# Patient Record
Sex: MALE | Race: WHITE | NOT HISPANIC OR LATINO | Employment: OTHER | URBAN - METROPOLITAN AREA
[De-identification: names, ages, dates, MRNs, and addresses within clinical notes are randomized per-mention and may not be internally consistent; named-entity substitution may affect disease eponyms.]

---

## 2017-01-04 DIAGNOSIS — D75.1 ERYTHROCYTOSIS: Primary | ICD-10-CM

## 2017-01-05 ENCOUNTER — OFFICE VISIT (OUTPATIENT)
Dept: ONCOLOGY | Facility: CLINIC | Age: 72
End: 2017-01-05

## 2017-01-05 ENCOUNTER — LAB (OUTPATIENT)
Dept: LAB | Facility: HOSPITAL | Age: 72
End: 2017-01-05

## 2017-01-05 ENCOUNTER — INFUSION (OUTPATIENT)
Dept: ONCOLOGY | Facility: HOSPITAL | Age: 72
End: 2017-01-05

## 2017-01-05 VITALS
RESPIRATION RATE: 16 BRPM | BODY MASS INDEX: 39.22 KG/M2 | HEART RATE: 88 BPM | SYSTOLIC BLOOD PRESSURE: 128 MMHG | WEIGHT: 264.8 LBS | TEMPERATURE: 98.1 F | OXYGEN SATURATION: 95 % | HEIGHT: 69 IN | DIASTOLIC BLOOD PRESSURE: 68 MMHG

## 2017-01-05 DIAGNOSIS — D75.1 ERYTHROCYTOSIS: Primary | ICD-10-CM

## 2017-01-05 DIAGNOSIS — D75.1 ERYTHROCYTOSIS: ICD-10-CM

## 2017-01-05 LAB
ALBUMIN SERPL-MCNC: 4.3 G/DL (ref 3.5–5.2)
ALBUMIN/GLOB SERPL: 1.5 G/DL (ref 1.1–2.4)
ALP SERPL-CCNC: 58 U/L (ref 38–116)
ALT SERPL W P-5'-P-CCNC: 32 U/L (ref 0–41)
ANION GAP SERPL CALCULATED.3IONS-SCNC: 12.9 MMOL/L
AST SERPL-CCNC: 25 U/L (ref 0–40)
BASOPHILS # BLD AUTO: 0.13 10*3/MM3 (ref 0–0.1)
BASOPHILS NFR BLD AUTO: 1.5 % (ref 0–1.1)
BILIRUB SERPL-MCNC: 0.4 MG/DL (ref 0.1–1.2)
BUN BLD-MCNC: 19 MG/DL (ref 6–20)
BUN/CREAT SERPL: 19.6 (ref 7.3–30)
CALCIUM SPEC-SCNC: 9.5 MG/DL (ref 8.5–10.2)
CHLORIDE SERPL-SCNC: 98 MMOL/L (ref 98–107)
CO2 SERPL-SCNC: 26.1 MMOL/L (ref 22–29)
CREAT BLD-MCNC: 0.97 MG/DL (ref 0.7–1.3)
DEPRECATED RDW RBC AUTO: 39.8 FL (ref 37–49)
EOSINOPHIL # BLD AUTO: 0.22 10*3/MM3 (ref 0–0.36)
EOSINOPHIL NFR BLD AUTO: 2.5 % (ref 1–5)
ERYTHROCYTE [DISTWIDTH] IN BLOOD BY AUTOMATED COUNT: 12.7 % (ref 11.7–14.5)
GFR SERPL CREATININE-BSD FRML MDRD: 76 ML/MIN/1.73
GLOBULIN UR ELPH-MCNC: 2.9 GM/DL (ref 1.8–3.5)
GLUCOSE BLD-MCNC: 118 MG/DL (ref 74–124)
HCT VFR BLD AUTO: 47.5 % (ref 40–49)
HGB BLD-MCNC: 16.8 G/DL (ref 13.5–16.5)
IMM GRANULOCYTES # BLD: 0.28 10*3/MM3 (ref 0–0.03)
IMM GRANULOCYTES NFR BLD: 3.2 % (ref 0–0.5)
LYMPHOCYTES # BLD AUTO: 1.91 10*3/MM3 (ref 1–3.5)
LYMPHOCYTES NFR BLD AUTO: 21.9 % (ref 20–49)
MCH RBC QN AUTO: 30.5 PG (ref 27–33)
MCHC RBC AUTO-ENTMCNC: 35.4 G/DL (ref 32–35)
MCV RBC AUTO: 86.4 FL (ref 83–97)
MONOCYTES # BLD AUTO: 1.16 10*3/MM3 (ref 0.25–0.8)
MONOCYTES NFR BLD AUTO: 13.3 % (ref 4–12)
NEUTROPHILS # BLD AUTO: 5.01 10*3/MM3 (ref 1.5–7)
NEUTROPHILS NFR BLD AUTO: 57.6 % (ref 39–75)
NRBC BLD MANUAL-RTO: 0 /100 WBC (ref 0–0)
PLATELET # BLD AUTO: 246 10*3/MM3 (ref 150–375)
PMV BLD AUTO: 9.2 FL (ref 8.9–12.1)
POTASSIUM BLD-SCNC: 4.2 MMOL/L (ref 3.5–4.7)
PROT SERPL-MCNC: 7.2 G/DL (ref 6.3–8)
RBC # BLD AUTO: 5.5 10*6/MM3 (ref 4.3–5.5)
SODIUM BLD-SCNC: 137 MMOL/L (ref 134–145)
WBC NRBC COR # BLD: 8.71 10*3/MM3 (ref 4–10)

## 2017-01-05 PROCEDURE — 80053 COMPREHEN METABOLIC PANEL: CPT | Performed by: INTERNAL MEDICINE

## 2017-01-05 PROCEDURE — 36416 COLLJ CAPILLARY BLOOD SPEC: CPT

## 2017-01-05 PROCEDURE — 99195 PHLEBOTOMY: CPT

## 2017-01-05 PROCEDURE — 36415 COLL VENOUS BLD VENIPUNCTURE: CPT | Performed by: INTERNAL MEDICINE

## 2017-01-05 PROCEDURE — 85025 COMPLETE CBC W/AUTO DIFF WBC: CPT

## 2017-01-05 PROCEDURE — 99214 OFFICE O/P EST MOD 30 MIN: CPT | Performed by: INTERNAL MEDICINE

## 2017-01-05 RX ORDER — SODIUM CHLORIDE 9 MG/ML
250 INJECTION, SOLUTION INTRAVENOUS ONCE
Status: CANCELLED | OUTPATIENT
Start: 2017-02-01

## 2017-01-05 NOTE — PROGRESS NOTES
Subjective     REASON FOR FOLLOWUP:   1 Erythrocytosis left shifted oxygen dissociation curve?  High affinity hemoglobin  2.  Bilateral kidney cysts                               REQUESTING PHYSICIAN: Alessandra ANDUJAR    History of Present Illness the patient is 71-year-old male with long-standing erythrocytosis returns today to review the results of his blood work and imaging.  We did a chest x-ray because he complained of a chronic cough and this is thankfully benign except for some mild hyperinflation/COPD which is unusual as he is a nonsmoker.  Suspect the lisinopril is causing his cough.  Ultrasound of kidneys showed bilateral large kidney cysts but no masses.  His erythropoietin level was low at 5 and his, carbon monoxide level was mildly elevated 2.4 and his P 50 was left shifted suggesting a possible-high affinity hemoglobin!  His tolerating phlebotomy well and he actually felt better after the phlebotomy and therefore we will continue for the time being although I did tell him that I would repeat his carbon monoxide level to make sure it is back down as he got a new furnace for his home and this may cause some changes.  And he could get his phlebotomies at the [facility] because he has no evidence of polycythemia vera/ bone marrow disorder at this point.    Past Medical History   Diagnosis Date   • Hypertension    • Kidney stone    • Low hemoglobin    • Skin cancer      Left forearm    skin cancers for which he takes light treatments periodically    Past Surgical History   Procedure Laterality Date   • Colonoscopy       x4   • Kidney stone surgery  2006   • Endoscopy  1972   • Vasectomy  1982   • Hernia repair  1995   vasectomy, lithotripsy      HEME HISTORY:  patient is a 70-year-old male fairly good health except for hypertension and hypercholesterolemia and kidney stones was noted on recent blood work to have an elevated hemoglobin and hematocrit of 17/50% on 2 or 3 occasions.  Patient was referred to us for  evaluation of this.  He has had some recent headaches and lightheadedness which she attributes to his blood pressure medicines but otherwise feels well.  His note night sweats weight loss or loss of appetite or pain anywhere.  He has been on the same blood pressure medicines for many years.  On questioning him further he reports that when he was in his early 20s he works as a  and his hemoglobin was high in the 16 g range even then and he thinks this may be a long term problem that he has had.  His 2 children and 3 sisters but does not know of anybody else has an elevated hemoglobin.  He is not a smoker does not have sleep apnea and has a carbon monoxide monitoring in his home.  He has had no recent CAT scans of the abdomen no hematuria or kidney disease.  He is not taking any testosterone injections or preparations.        Current Outpatient Prescriptions:   •  aspirin 81 MG disintegrating tablet, Take 81 mg by mouth., Disp: , Rfl:   •  atorvastatin (LIPITOR) 10 MG tablet, , Disp: , Rfl:   •  cetirizine (zyrTEC) 10 MG tablet, Take 10 mg by mouth Daily., Disp: , Rfl:   •  lisinopril (PRINIVIL,ZESTRIL) 20 MG tablet, , Disp: , Rfl:   •  omega-3 acid ethyl esters (LOVAZA) 1 G capsule, , Disp: , Rfl:   •  triamterene-hydrochlorothiazide (MAXZIDE) 75-50 MG per tablet, Take 1 tablet by mouth., Disp: , Rfl:     ALLERGIES:    Allergies   Allergen Reactions   • Erythromycin    • Sulfa Antibiotics         Social History     Social History   • Marital status:      Spouse name: Radha   • Number of children: N/A   • Years of education: High school     Occupational History   • Electronic maintenance` Retired     [employer]     Social History Main Topics   • Smoking status: Never Smoker   • Smokeless tobacco: None   • Alcohol use Yes      Comment: Occasionally   • Drug use: No   • Sexual activity: Not Asked     Other Topics Concern   • None     Social History Narrative     does not smoke or drink no risk factors for  "HIV worked in [employer] has no chemicals or toxic exposure     Family History   Problem Relation Age of Onset   • Heart disease Mother    • Hypertension Mother    • Cancer Son 43     Squamous cell CA in tonsils       30-year-old son had squamous cell carcinoma of the tonsil 7 years ago and is JESSIE with no other malignancy in the family nobody has a high hemoglobin in the family     Review of Systems   Respiratory: Positive for cough.    Endocrine: Positive for heat intolerance and polydipsia.   Genitourinary: Positive for frequency.   Skin:        itching          Objective     Vitals:    01/05/17 0827   BP: 128/68   Pulse: 88   Resp: 16   Temp: 98.1 °F (36.7 °C)   TempSrc: Oral   SpO2: 95%   Weight: 264 lb 12.8 oz (120 kg)   Height: 69.49\" (176.5 cm)   PainSc: 0-No pain     Current Status 1/5/2017   ECOG score 0       Physical Exam    GENERAL:  Well-developed, well-nourished in no acute distress.   SKIN:  Warm, dry without rashes, purpura or petechiae.  HEAD:  Normocephalic.  EYES:  Pupils equal, round and reactive to light.  EOMs intact.  Conjunctivae normal.  EARS:  Hearing intact.  NOSE:  Septum midline.  No excoriations or nasal discharge.  MOUTH:  Tongue is well-papillated; no stomatitis or ulcers.  Lips normal.  THROAT:  Oropharynx without lesions or exudates.  NECK:  Supple with good range of motion; no thyromegaly or masses, no JVD.  LYMPHATICS:  No cervical, supraclavicular, axillary or inguinal adenopathy.  CHEST:  Lungs clear to percussion and auscultation. Good airflow.  CARDIAC:  Regular rate and rhythm without murmurs, rubs or gallops. Normal S1,S2.  ABDOMEN:  Soft, nontender with no organomegaly or masses.  EXTREMITIES:  No clubbing, cyanosis or edema.  NEUROLOGICAL:  Cranial Nerves II-XII grossly intact.  No focal neurological deficits.  PSYCHIATRIC:  Normal affect and mood.        RECENT LABS:  Hematology WBC   Date Value Ref Range Status   01/05/2017 8.71 4.00 - 10.00 10*3/mm3 " Final     RBC   Date Value Ref Range Status   01/05/2017 5.50 4.30 - 5.50 10*6/mm3 Final     HEMOGLOBIN   Date Value Ref Range Status   01/05/2017 16.8 (H) 13.5 - 16.5 g/dL Final     HEMATOCRIT   Date Value Ref Range Status   01/05/2017 47.5 40.0 - 49.0 % Final     PLATELETS   Date Value Ref Range Status   01/05/2017 246 150 - 375 10*3/mm3 Final        Peripheral blood smear shows normochromic normocytic red cells without polychromasia white cells show no increase in immature forms.  Occasional Dohle bodies are noted there is no eosinophilia or basophilia.  Platelets are increased but normal morphologically.    Assessment/Plan      1.  Isolated Erythrocytosis without leukocytosis or thrombocytosis- --the long duration of his symptoms suggest possibly a high affinity hemoglobin which is back to up by the left shifted P 50  2.  headaches and dizziness possibly related to erythrocytosis-improved after phlebotomy  3.  Cough etiology unclear -possibly from lisinopril    plan   1. one unit phlebotomy today -would like to keep his hematocrit below 47%  2.  Return in 3 months phlebotomy and M.D. visit in 6 months  3.  Carboxyhemoglobin level to be repeated today  He may benefit from avoiding a diuretic in terms of his hematocrit and he will discuss this with Dr. Aparicio

## 2017-01-06 LAB — COHGB MFR BLD: 2.3 % (ref 0–1.9)

## 2017-03-28 DIAGNOSIS — D75.1 ERYTHROCYTOSIS: Primary | ICD-10-CM

## 2017-03-30 ENCOUNTER — INFUSION (OUTPATIENT)
Dept: ONCOLOGY | Facility: HOSPITAL | Age: 72
End: 2017-03-30

## 2017-03-30 ENCOUNTER — LAB (OUTPATIENT)
Dept: LAB | Facility: HOSPITAL | Age: 72
End: 2017-03-30

## 2017-03-30 VITALS
BODY MASS INDEX: 39.02 KG/M2 | SYSTOLIC BLOOD PRESSURE: 113 MMHG | WEIGHT: 268 LBS | HEART RATE: 90 BPM | DIASTOLIC BLOOD PRESSURE: 72 MMHG | TEMPERATURE: 98.2 F

## 2017-03-30 DIAGNOSIS — D75.1 ERYTHROCYTOSIS: Primary | ICD-10-CM

## 2017-03-30 DIAGNOSIS — D75.1 ERYTHROCYTOSIS: ICD-10-CM

## 2017-03-30 LAB
BASOPHILS # BLD AUTO: 0.09 10*3/MM3 (ref 0–0.1)
BASOPHILS NFR BLD AUTO: 1.3 % (ref 0–1.1)
DEPRECATED RDW RBC AUTO: 37.9 FL (ref 37–49)
EOSINOPHIL # BLD AUTO: 0.13 10*3/MM3 (ref 0–0.36)
EOSINOPHIL NFR BLD AUTO: 1.9 % (ref 1–5)
ERYTHROCYTE [DISTWIDTH] IN BLOOD BY AUTOMATED COUNT: 12.3 % (ref 11.7–14.5)
HCT VFR BLD AUTO: 49.6 % (ref 40–49)
HGB BLD-MCNC: 17.4 G/DL (ref 13.5–16.5)
IMM GRANULOCYTES # BLD: 0.11 10*3/MM3 (ref 0–0.03)
IMM GRANULOCYTES NFR BLD: 1.6 % (ref 0–0.5)
LYMPHOCYTES # BLD AUTO: 1.21 10*3/MM3 (ref 1–3.5)
LYMPHOCYTES NFR BLD AUTO: 17.8 % (ref 20–49)
MCH RBC QN AUTO: 29.9 PG (ref 27–33)
MCHC RBC AUTO-ENTMCNC: 35.1 G/DL (ref 32–35)
MCV RBC AUTO: 85.2 FL (ref 83–97)
MONOCYTES # BLD AUTO: 1.29 10*3/MM3 (ref 0.25–0.8)
MONOCYTES NFR BLD AUTO: 18.9 % (ref 4–12)
NEUTROPHILS # BLD AUTO: 3.98 10*3/MM3 (ref 1.5–7)
NEUTROPHILS NFR BLD AUTO: 58.5 % (ref 39–75)
NRBC BLD MANUAL-RTO: 0 /100 WBC (ref 0–0)
PLATELET # BLD AUTO: 239 10*3/MM3 (ref 150–375)
PMV BLD AUTO: 9.5 FL (ref 8.9–12.1)
RBC # BLD AUTO: 5.82 10*6/MM3 (ref 4.3–5.5)
WBC NRBC COR # BLD: 6.81 10*3/MM3 (ref 4–10)

## 2017-03-30 PROCEDURE — 36416 COLLJ CAPILLARY BLOOD SPEC: CPT

## 2017-03-30 PROCEDURE — 85025 COMPLETE CBC W/AUTO DIFF WBC: CPT

## 2017-03-30 PROCEDURE — 99195 PHLEBOTOMY: CPT | Performed by: INTERNAL MEDICINE

## 2017-03-30 RX ORDER — SODIUM CHLORIDE 9 MG/ML
250 INJECTION, SOLUTION INTRAVENOUS ONCE
Status: CANCELLED | OUTPATIENT
Start: 2017-04-01

## 2017-03-30 NOTE — PROGRESS NOTES
Increase po fluids (no caffeine), no heavy lifting and no alcohol beverages x 24 hr    Instructed to leave dsg in place for one hr to minimize bruising.

## 2017-07-05 DIAGNOSIS — D75.1 ERYTHROCYTOSIS: Primary | ICD-10-CM

## 2017-07-06 ENCOUNTER — INFUSION (OUTPATIENT)
Dept: ONCOLOGY | Facility: HOSPITAL | Age: 72
End: 2017-07-06

## 2017-07-06 ENCOUNTER — LAB (OUTPATIENT)
Dept: LAB | Facility: HOSPITAL | Age: 72
End: 2017-07-06

## 2017-07-06 ENCOUNTER — OFFICE VISIT (OUTPATIENT)
Dept: ONCOLOGY | Facility: CLINIC | Age: 72
End: 2017-07-06

## 2017-07-06 VITALS
OXYGEN SATURATION: 97 % | WEIGHT: 267.6 LBS | DIASTOLIC BLOOD PRESSURE: 78 MMHG | SYSTOLIC BLOOD PRESSURE: 124 MMHG | BODY MASS INDEX: 37.46 KG/M2 | HEIGHT: 71 IN | HEART RATE: 78 BPM | RESPIRATION RATE: 14 BRPM | TEMPERATURE: 97.8 F

## 2017-07-06 DIAGNOSIS — D75.1 ERYTHROCYTOSIS: Primary | ICD-10-CM

## 2017-07-06 LAB
ALBUMIN SERPL-MCNC: 4.3 G/DL (ref 3.5–5.2)
ALBUMIN/GLOB SERPL: 1.5 G/DL (ref 1.1–2.4)
ALP SERPL-CCNC: 53 U/L (ref 38–116)
ALT SERPL W P-5'-P-CCNC: 29 U/L (ref 0–41)
ANION GAP SERPL CALCULATED.3IONS-SCNC: 11.8 MMOL/L
AST SERPL-CCNC: 24 U/L (ref 0–40)
BASOPHILS # BLD AUTO: 0.1 10*3/MM3 (ref 0–0.1)
BASOPHILS NFR BLD AUTO: 1.3 % (ref 0–1.1)
BILIRUB SERPL-MCNC: 0.6 MG/DL (ref 0.1–1.2)
BUN BLD-MCNC: 24 MG/DL (ref 6–20)
BUN/CREAT SERPL: 22.4 (ref 7.3–30)
CALCIUM SPEC-SCNC: 9.5 MG/DL (ref 8.5–10.2)
CHLORIDE SERPL-SCNC: 100 MMOL/L (ref 98–107)
CO2 SERPL-SCNC: 27.2 MMOL/L (ref 22–29)
CREAT BLD-MCNC: 1.07 MG/DL (ref 0.7–1.3)
DEPRECATED RDW RBC AUTO: 42.5 FL (ref 37–49)
EOSINOPHIL # BLD AUTO: 0.18 10*3/MM3 (ref 0–0.36)
EOSINOPHIL NFR BLD AUTO: 2.3 % (ref 1–5)
ERYTHROCYTE [DISTWIDTH] IN BLOOD BY AUTOMATED COUNT: 13 % (ref 11.7–14.5)
GFR SERPL CREATININE-BSD FRML MDRD: 68 ML/MIN/1.73
GLOBULIN UR ELPH-MCNC: 2.8 GM/DL (ref 1.8–3.5)
GLUCOSE BLD-MCNC: 106 MG/DL (ref 74–124)
HCT VFR BLD AUTO: 50.2 % (ref 40–49)
HGB BLD-MCNC: 17 G/DL (ref 13.5–16.5)
HOLD SPECIMEN: NORMAL
IMM GRANULOCYTES # BLD: 0.13 10*3/MM3 (ref 0–0.03)
IMM GRANULOCYTES NFR BLD: 1.7 % (ref 0–0.5)
LYMPHOCYTES # BLD AUTO: 1.73 10*3/MM3 (ref 1–3.5)
LYMPHOCYTES NFR BLD AUTO: 22.1 % (ref 20–49)
MCH RBC QN AUTO: 30.1 PG (ref 27–33)
MCHC RBC AUTO-ENTMCNC: 33.9 G/DL (ref 32–35)
MCV RBC AUTO: 88.8 FL (ref 83–97)
MONOCYTES # BLD AUTO: 0.95 10*3/MM3 (ref 0.25–0.8)
MONOCYTES NFR BLD AUTO: 12.1 % (ref 4–12)
NEUTROPHILS # BLD AUTO: 4.74 10*3/MM3 (ref 1.5–7)
NEUTROPHILS NFR BLD AUTO: 60.5 % (ref 39–75)
NRBC BLD MANUAL-RTO: 0 /100 WBC (ref 0–0)
PLATELET # BLD AUTO: 271 10*3/MM3 (ref 150–375)
PMV BLD AUTO: 9.1 FL (ref 8.9–12.1)
POTASSIUM BLD-SCNC: 4.8 MMOL/L (ref 3.5–4.7)
PROT SERPL-MCNC: 7.1 G/DL (ref 6.3–8)
RBC # BLD AUTO: 5.65 10*6/MM3 (ref 4.3–5.5)
SODIUM BLD-SCNC: 139 MMOL/L (ref 134–145)
WBC NRBC COR # BLD: 7.83 10*3/MM3 (ref 4–10)

## 2017-07-06 PROCEDURE — 80053 COMPREHEN METABOLIC PANEL: CPT

## 2017-07-06 PROCEDURE — 36415 COLL VENOUS BLD VENIPUNCTURE: CPT

## 2017-07-06 PROCEDURE — 99214 OFFICE O/P EST MOD 30 MIN: CPT | Performed by: INTERNAL MEDICINE

## 2017-07-06 PROCEDURE — 85025 COMPLETE CBC W/AUTO DIFF WBC: CPT

## 2017-07-06 PROCEDURE — 99195 PHLEBOTOMY: CPT | Performed by: INTERNAL MEDICINE

## 2017-07-06 RX ORDER — SODIUM CHLORIDE 9 MG/ML
250 INJECTION, SOLUTION INTRAVENOUS ONCE
Status: CANCELLED | OUTPATIENT
Start: 2017-07-06

## 2017-07-06 RX ORDER — SODIUM CHLORIDE 9 MG/ML
250 INJECTION, SOLUTION INTRAVENOUS ONCE
Status: DISCONTINUED | OUTPATIENT
Start: 2017-07-06 | End: 2017-07-06 | Stop reason: HOSPADM

## 2017-07-06 NOTE — PROGRESS NOTES
Subjective     REASON FOR FOLLOWUP:   1 Erythrocytosis left shifted oxygen dissociation curve?  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  Bilateral kidney cysts  3.  Persistent elevated carboxyhemoglobin with no obvious cause                             REQUESTING PHYSICIAN: Alessandra ANDUJAR    History of Present Illness patient 71-year-old male with erythrocytosis with persistent elevation of carboxyhemoglobin levels no obvious etiology and also left shifted P 50 suggesting a high affinity hemoglobin although carboxyhemoglobin alone can do this.  Anyway he is getting phlebotomies every 3 months and is tolerating it well and he actually feels better with less dizziness since his hematocrit was lowered.  He remains on a diuretic which probably worsens the erythrocytosis relatively and it may be better to take him off this but he will discuss this with his primary care doctor.    His repeat carboxyhemoglobin was also elevated in January despite changing his furnace and getting a CO monitor.  I cannot explain why this is still elevated!      Past Medical History:   Diagnosis Date   • Hypertension    • Kidney stone    • Low hemoglobin    • Skin cancer     Left forearm    skin cancers for which he takes light treatments periodically    Past Surgical History:   Procedure Laterality Date   • COLONOSCOPY      x4   • ENDOSCOPY  1972   • HERNIA REPAIR  1995   • KIDNEY STONE SURGERY  2006   • VASECTOMY  1982   vasectomy, lithotripsy      HEME HISTORY:  patient is a 70-year-old male fairly good health except for hypertension and hypercholesterolemia and kidney stones was noted on recent blood work to have an elevated hemoglobin and hematocrit of 17/50% on 2 or 3 occasions.  Patient was referred to us for evaluation of this.  He has had some recent headaches and lightheadedness which she attributes to his blood pressure medicines but otherwise feels well.  His note night sweats weight loss or loss of appetite or pain anywhere.   He has been on the same blood pressure medicines for many years.  On questioning him further he reports that when he was in his early 20s he works as a  and his hemoglobin was high in the 16 g range even then and he thinks this may be a long term problem that he has had.  His 2 children and 3 sisters but does not know of anybody else has an elevated hemoglobin.  He is not a smoker does not have sleep apnea and has a carbon monoxide monitoring in his home.  He has had no recent CAT scans of the abdomen no hematuria or kidney disease.  He is not taking any testosterone injections or preparations.    71-year-old male with long-standing erythrocytosis returns today to review the results of his blood work and imaging.  We did a chest x-ray because he complained of a chronic cough and this is thankfully benign except for some mild hyperinflation/COPD which is unusual as he is a nonsmoker.  Suspect the lisinopril is causing his cough.  Ultrasound of kidneys showed bilateral large kidney cysts but no masses.  His erythropoietin level was low at 5 and his, carbon monoxide level was mildly elevated 2.4 and his P 50 was left shifted suggesting a possible-high affinity hemoglobin!  His tolerating phlebotomy well and he actually felt better after the phlebotomy and therefore we will continue for the time being although I did tell him that I would repeat his carbon monoxide level to make sure it is back down as he got a new furnace for his home and this may cause some changes.  And he could get his phlebotomies at the [facility] because he has no evidence of polycythemia vera/ bone marrow disorder at this point.        Current Outpatient Prescriptions:   •  aspirin 81 MG disintegrating tablet, Take 81 mg by mouth., Disp: , Rfl:   •  atorvastatin (LIPITOR) 10 MG tablet, , Disp: , Rfl:   •  cetirizine (zyrTEC) 10 MG tablet, Take 10 mg by mouth Daily., Disp: , Rfl:   •  lisinopril (PRINIVIL,ZESTRIL) 20 MG tablet, , Disp: ,  "Rfl:   •  omega-3 acid ethyl esters (LOVAZA) 1 G capsule, , Disp: , Rfl:   •  triamterene-hydrochlorothiazide (MAXZIDE) 75-50 MG per tablet, Take 1 tablet by mouth., Disp: , Rfl:     ALLERGIES:    Allergies   Allergen Reactions   • Erythromycin    • Sulfa Antibiotics         Social History     Social History   • Marital status:      Spouse name: Radha   • Number of children: N/A   • Years of education: High school     Occupational History   • Electronic maintenance` Retired     [email or web address]     Social History Main Topics   • Smoking status: Never Smoker   • Smokeless tobacco: None   • Alcohol use Yes      Comment: Occasionally   • Drug use: No   • Sexual activity: Not Asked     Other Topics Concern   • None     Social History Narrative     does not smoke or drink no risk factors for HIV worked in [employer] has no chemicals or toxic exposure     Family History   Problem Relation Age of Onset   • Heart disease Mother    • Hypertension Mother    • Cancer Son 43     Squamous cell CA in tonsils       30-year-old son had squamous cell carcinoma of the tonsil 7 years ago and is JESSIE with no other malignancy in the family nobody has a high hemoglobin in the family     Review of Systems   Respiratory: Positive for cough.    Endocrine: Positive for heat intolerance and polydipsia.   Genitourinary: Positive for frequency.   Skin:        itching          Objective     Vitals:    07/06/17 1012   BP: 124/78   Pulse: 78   Resp: 14   Temp: 97.8 °F (36.6 °C)   TempSrc: Oral   SpO2: 97%   Weight: 267 lb 9.6 oz (121 kg)   Height: 70.87\" (180 cm)  Comment: new height   PainSc: 0-No pain     Current Status 7/6/2017   ECOG score 0       Physical Exam    GENERAL:  Well-developed, well-nourished in no acute distress.   SKIN:  Warm, dry without rashes, purpura or petechiae.  HEAD:  Normocephalic.  EYES:  Pupils equal, round and reactive to light.  EOMs intact.  Conjunctivae normal.  EARS:  Hearing intact.  NOSE:  Septum " midline.  No excoriations or nasal discharge.  MOUTH:  Tongue is well-papillated; no stomatitis or ulcers.  Lips normal.  THROAT:  Oropharynx without lesions or exudates.  NECK:  Supple with good range of motion; no thyromegaly or masses, no JVD.  LYMPHATICS:  No cervical, supraclavicular, axillary or inguinal adenopathy.  CHEST:  Lungs clear to percussion and auscultation. Good airflow.  CARDIAC:  Regular rate and rhythm without murmurs, rubs or gallops. Normal S1,S2.  ABDOMEN:  Soft, nontender with no organomegaly or masses.  EXTREMITIES:  No clubbing, cyanosis or edema.  NEUROLOGICAL:  Cranial Nerves II-XII grossly intact.  No focal neurological deficits.  PSYCHIATRIC:  Normal affect and mood.        RECENT LABS:  Hematology WBC   Date Value Ref Range Status   07/06/2017 7.83 4.00 - 10.00 10*3/mm3 Final     RBC   Date Value Ref Range Status   07/06/2017 5.65 (H) 4.30 - 5.50 10*6/mm3 Final     Hemoglobin   Date Value Ref Range Status   07/06/2017 17.0 (H) 13.5 - 16.5 g/dL Final     Hematocrit   Date Value Ref Range Status   07/06/2017 50.2 (H) 40.0 - 49.0 % Final     Platelets   Date Value Ref Range Status   07/06/2017 271 150 - 375 10*3/mm3 Final        Peripheral blood smear shows normochromic normocytic red cells without polychromasia white cells show no increase in immature forms.  Occasional Dohle bodies are noted there is no eosinophilia or basophilia.  Platelets are increased but normal morphologically.    Assessment/Plan      1.  Isolated Erythrocytosis without leukocytosis or thrombocytosis- --the long duration of his symptoms suggest possibly a high affinity hemoglobin which is backed up by the left shifted P 50  2.  headaches and dizziness possibly related to erythrocytosis-improved after phlebotomy  3.  Cough etiology unclear -possibly from lisinopril-Negative chest x-ray    plan   1. one unit phlebotomy today -would like to keep his hematocrit below 47%  2.  Return Q 3 months phlebotomy and M.D. visit  in 12months  He may benefit from avoiding a diuretic in terms of his hematocrit and he will discuss this with Dr. Aparicio

## 2017-09-25 ENCOUNTER — LAB (OUTPATIENT)
Dept: LAB | Facility: HOSPITAL | Age: 72
End: 2017-09-25

## 2017-09-25 ENCOUNTER — INFUSION (OUTPATIENT)
Dept: ONCOLOGY | Facility: HOSPITAL | Age: 72
End: 2017-09-25

## 2017-09-25 VITALS
DIASTOLIC BLOOD PRESSURE: 76 MMHG | TEMPERATURE: 98.4 F | HEART RATE: 81 BPM | SYSTOLIC BLOOD PRESSURE: 136 MMHG | BODY MASS INDEX: 37.13 KG/M2 | WEIGHT: 265.2 LBS

## 2017-09-25 DIAGNOSIS — D75.1 ERYTHROCYTOSIS: Primary | ICD-10-CM

## 2017-09-25 DIAGNOSIS — D75.1 ERYTHROCYTOSIS: ICD-10-CM

## 2017-09-25 LAB
BASOPHILS # BLD AUTO: 0.13 10*3/MM3 (ref 0–0.1)
BASOPHILS NFR BLD AUTO: 1.3 % (ref 0–1.1)
DEPRECATED RDW RBC AUTO: 40.2 FL (ref 37–49)
EOSINOPHIL # BLD AUTO: 0.26 10*3/MM3 (ref 0–0.36)
EOSINOPHIL NFR BLD AUTO: 2.7 % (ref 1–5)
ERYTHROCYTE [DISTWIDTH] IN BLOOD BY AUTOMATED COUNT: 12.8 % (ref 11.7–14.5)
HCT VFR BLD AUTO: 49.7 % (ref 40–49)
HGB BLD-MCNC: 17.3 G/DL (ref 13.5–16.5)
IMM GRANULOCYTES # BLD: 0.22 10*3/MM3 (ref 0–0.03)
IMM GRANULOCYTES NFR BLD: 2.3 % (ref 0–0.5)
LYMPHOCYTES # BLD AUTO: 2.05 10*3/MM3 (ref 1–3.5)
LYMPHOCYTES NFR BLD AUTO: 21.2 % (ref 20–49)
MCH RBC QN AUTO: 30.2 PG (ref 27–33)
MCHC RBC AUTO-ENTMCNC: 34.8 G/DL (ref 32–35)
MCV RBC AUTO: 86.9 FL (ref 83–97)
MONOCYTES # BLD AUTO: 1.55 10*3/MM3 (ref 0.25–0.8)
MONOCYTES NFR BLD AUTO: 16 % (ref 4–12)
NEUTROPHILS # BLD AUTO: 5.46 10*3/MM3 (ref 1.5–7)
NEUTROPHILS NFR BLD AUTO: 56.5 % (ref 39–75)
NRBC BLD MANUAL-RTO: 0 /100 WBC (ref 0–0)
PLATELET # BLD AUTO: 254 10*3/MM3 (ref 150–375)
PMV BLD AUTO: 9.4 FL (ref 8.9–12.1)
RBC # BLD AUTO: 5.72 10*6/MM3 (ref 4.3–5.5)
WBC NRBC COR # BLD: 9.67 10*3/MM3 (ref 4–10)

## 2017-09-25 PROCEDURE — 36416 COLLJ CAPILLARY BLOOD SPEC: CPT | Performed by: INTERNAL MEDICINE

## 2017-09-25 PROCEDURE — 99195 PHLEBOTOMY: CPT | Performed by: INTERNAL MEDICINE

## 2017-09-25 PROCEDURE — 85025 COMPLETE CBC W/AUTO DIFF WBC: CPT | Performed by: INTERNAL MEDICINE

## 2017-09-25 RX ORDER — SODIUM CHLORIDE 9 MG/ML
250 INJECTION, SOLUTION INTRAVENOUS ONCE
Status: CANCELLED | OUTPATIENT
Start: 2017-09-25

## 2017-12-13 RX ORDER — SODIUM CHLORIDE 9 MG/ML
250 INJECTION, SOLUTION INTRAVENOUS ONCE
Status: CANCELLED | OUTPATIENT
Start: 2017-12-18

## 2017-12-18 ENCOUNTER — INFUSION (OUTPATIENT)
Dept: ONCOLOGY | Facility: HOSPITAL | Age: 72
End: 2017-12-18

## 2017-12-18 ENCOUNTER — LAB (OUTPATIENT)
Dept: LAB | Facility: HOSPITAL | Age: 72
End: 2017-12-18

## 2017-12-18 VITALS
SYSTOLIC BLOOD PRESSURE: 136 MMHG | TEMPERATURE: 98.4 F | WEIGHT: 263.2 LBS | HEART RATE: 91 BPM | BODY MASS INDEX: 36.85 KG/M2 | DIASTOLIC BLOOD PRESSURE: 71 MMHG

## 2017-12-18 DIAGNOSIS — D75.1 ERYTHROCYTOSIS: ICD-10-CM

## 2017-12-18 DIAGNOSIS — D75.1 ERYTHROCYTOSIS: Primary | ICD-10-CM

## 2017-12-18 LAB
BASOPHILS # BLD AUTO: 0.11 10*3/MM3 (ref 0–0.1)
BASOPHILS NFR BLD AUTO: 1.2 % (ref 0–1.1)
DEPRECATED RDW RBC AUTO: 40.4 FL (ref 37–49)
EOSINOPHIL # BLD AUTO: 0.13 10*3/MM3 (ref 0–0.36)
EOSINOPHIL NFR BLD AUTO: 1.5 % (ref 1–5)
ERYTHROCYTE [DISTWIDTH] IN BLOOD BY AUTOMATED COUNT: 13 % (ref 11.7–14.5)
HCT VFR BLD AUTO: 50.6 % (ref 40–49)
HGB BLD-MCNC: 17.3 G/DL (ref 13.5–16.5)
IMM GRANULOCYTES # BLD: 0.07 10*3/MM3 (ref 0–0.03)
IMM GRANULOCYTES NFR BLD: 0.8 % (ref 0–0.5)
LYMPHOCYTES # BLD AUTO: 1.66 10*3/MM3 (ref 1–3.5)
LYMPHOCYTES NFR BLD AUTO: 18.8 % (ref 20–49)
MCH RBC QN AUTO: 29.5 PG (ref 27–33)
MCHC RBC AUTO-ENTMCNC: 34.2 G/DL (ref 32–35)
MCV RBC AUTO: 86.3 FL (ref 83–97)
MONOCYTES # BLD AUTO: 1.18 10*3/MM3 (ref 0.25–0.8)
MONOCYTES NFR BLD AUTO: 13.3 % (ref 4–12)
NEUTROPHILS # BLD AUTO: 5.69 10*3/MM3 (ref 1.5–7)
NEUTROPHILS NFR BLD AUTO: 64.4 % (ref 39–75)
NRBC BLD MANUAL-RTO: 0 /100 WBC (ref 0–0)
PLATELET # BLD AUTO: 297 10*3/MM3 (ref 150–375)
PMV BLD AUTO: 9.1 FL (ref 8.9–12.1)
RBC # BLD AUTO: 5.86 10*6/MM3 (ref 4.3–5.5)
WBC NRBC COR # BLD: 8.84 10*3/MM3 (ref 4–10)

## 2017-12-18 PROCEDURE — 36415 COLL VENOUS BLD VENIPUNCTURE: CPT | Performed by: INTERNAL MEDICINE

## 2017-12-18 PROCEDURE — 99195 PHLEBOTOMY: CPT | Performed by: INTERNAL MEDICINE

## 2017-12-18 PROCEDURE — 85025 COMPLETE CBC W/AUTO DIFF WBC: CPT | Performed by: INTERNAL MEDICINE

## 2017-12-18 RX ORDER — SODIUM CHLORIDE 9 MG/ML
250 INJECTION, SOLUTION INTRAVENOUS ONCE
Status: CANCELLED | OUTPATIENT
Start: 2017-12-18

## 2017-12-18 RX ORDER — SODIUM CHLORIDE 9 MG/ML
250 INJECTION, SOLUTION INTRAVENOUS ONCE
Status: DISCONTINUED | OUTPATIENT
Start: 2017-12-18 | End: 2017-12-18 | Stop reason: HOSPADM

## 2018-03-12 ENCOUNTER — APPOINTMENT (OUTPATIENT)
Dept: ONCOLOGY | Facility: HOSPITAL | Age: 73
End: 2018-03-12

## 2018-03-12 ENCOUNTER — APPOINTMENT (OUTPATIENT)
Dept: LAB | Facility: HOSPITAL | Age: 73
End: 2018-03-12

## 2018-04-16 ENCOUNTER — LAB (OUTPATIENT)
Dept: LAB | Facility: HOSPITAL | Age: 73
End: 2018-04-16

## 2018-04-16 ENCOUNTER — INFUSION (OUTPATIENT)
Dept: ONCOLOGY | Facility: HOSPITAL | Age: 73
End: 2018-04-16

## 2018-04-16 VITALS
BODY MASS INDEX: 34.1 KG/M2 | TEMPERATURE: 98.3 F | WEIGHT: 243.6 LBS | HEART RATE: 78 BPM | DIASTOLIC BLOOD PRESSURE: 77 MMHG | SYSTOLIC BLOOD PRESSURE: 132 MMHG

## 2018-04-16 DIAGNOSIS — D75.1 ERYTHROCYTOSIS: Primary | ICD-10-CM

## 2018-04-16 DIAGNOSIS — D75.1 ERYTHROCYTOSIS: ICD-10-CM

## 2018-04-16 LAB
BASOPHILS # BLD AUTO: 0.1 10*3/MM3 (ref 0–0.1)
BASOPHILS NFR BLD AUTO: 0.9 % (ref 0–1.1)
DEPRECATED RDW RBC AUTO: 42.8 FL (ref 37–49)
EOSINOPHIL # BLD AUTO: 0.16 10*3/MM3 (ref 0–0.36)
EOSINOPHIL NFR BLD AUTO: 1.5 % (ref 1–5)
ERYTHROCYTE [DISTWIDTH] IN BLOOD BY AUTOMATED COUNT: 13.8 % (ref 11.7–14.5)
HCT VFR BLD AUTO: 49.1 % (ref 40–49)
HGB BLD-MCNC: 16.5 G/DL (ref 13.5–16.5)
IMM GRANULOCYTES # BLD: 0.14 10*3/MM3 (ref 0–0.03)
IMM GRANULOCYTES NFR BLD: 1.3 % (ref 0–0.5)
LYMPHOCYTES # BLD AUTO: 1.75 10*3/MM3 (ref 1–3.5)
LYMPHOCYTES NFR BLD AUTO: 16.5 % (ref 20–49)
MCH RBC QN AUTO: 28.8 PG (ref 27–33)
MCHC RBC AUTO-ENTMCNC: 33.6 G/DL (ref 32–35)
MCV RBC AUTO: 85.7 FL (ref 83–97)
MONOCYTES # BLD AUTO: 1.26 10*3/MM3 (ref 0.25–0.8)
MONOCYTES NFR BLD AUTO: 11.9 % (ref 4–12)
NEUTROPHILS # BLD AUTO: 7.18 10*3/MM3 (ref 1.5–7)
NEUTROPHILS NFR BLD AUTO: 67.9 % (ref 39–75)
NRBC BLD MANUAL-RTO: 0 /100 WBC (ref 0–0)
PLATELET # BLD AUTO: 269 10*3/MM3 (ref 150–375)
PMV BLD AUTO: 9.6 FL (ref 8.9–12.1)
RBC # BLD AUTO: 5.73 10*6/MM3 (ref 4.3–5.5)
WBC NRBC COR # BLD: 10.59 10*3/MM3 (ref 4–10)

## 2018-04-16 PROCEDURE — 85025 COMPLETE CBC W/AUTO DIFF WBC: CPT | Performed by: INTERNAL MEDICINE

## 2018-04-16 PROCEDURE — 99195 PHLEBOTOMY: CPT | Performed by: NURSE PRACTITIONER

## 2018-04-16 PROCEDURE — 36416 COLLJ CAPILLARY BLOOD SPEC: CPT | Performed by: INTERNAL MEDICINE

## 2018-04-16 RX ORDER — SODIUM CHLORIDE 9 MG/ML
250 INJECTION, SOLUTION INTRAVENOUS ONCE
Status: CANCELLED | OUTPATIENT
Start: 2018-04-16

## 2018-04-16 RX ORDER — SODIUM CHLORIDE 9 MG/ML
250 INJECTION, SOLUTION INTRAVENOUS ONCE
Status: DISCONTINUED | OUTPATIENT
Start: 2018-04-16 | End: 2018-04-16 | Stop reason: HOSPADM

## 2018-06-19 ENCOUNTER — INFUSION (OUTPATIENT)
Dept: ONCOLOGY | Facility: HOSPITAL | Age: 73
End: 2018-06-19

## 2018-06-19 ENCOUNTER — OFFICE VISIT (OUTPATIENT)
Dept: ONCOLOGY | Facility: CLINIC | Age: 73
End: 2018-06-19

## 2018-06-19 ENCOUNTER — LAB (OUTPATIENT)
Dept: LAB | Facility: HOSPITAL | Age: 73
End: 2018-06-19

## 2018-06-19 VITALS
RESPIRATION RATE: 16 BRPM | HEIGHT: 71 IN | SYSTOLIC BLOOD PRESSURE: 118 MMHG | TEMPERATURE: 98 F | WEIGHT: 243.2 LBS | BODY MASS INDEX: 34.05 KG/M2 | OXYGEN SATURATION: 96 % | HEART RATE: 67 BPM | DIASTOLIC BLOOD PRESSURE: 80 MMHG

## 2018-06-19 DIAGNOSIS — D75.1 ERYTHROCYTOSIS: Primary | ICD-10-CM

## 2018-06-19 DIAGNOSIS — D75.1 ERYTHROCYTOSIS: ICD-10-CM

## 2018-06-19 LAB
ALBUMIN SERPL-MCNC: 4.4 G/DL (ref 3.5–5.2)
ALBUMIN/GLOB SERPL: 1.4 G/DL (ref 1.1–2.4)
ALP SERPL-CCNC: 62 U/L (ref 38–116)
ALT SERPL W P-5'-P-CCNC: 17 U/L (ref 0–41)
ANION GAP SERPL CALCULATED.3IONS-SCNC: 11.4 MMOL/L
AST SERPL-CCNC: 21 U/L (ref 0–40)
BASOPHILS # BLD AUTO: 0.12 10*3/MM3 (ref 0–0.1)
BASOPHILS NFR BLD AUTO: 1.3 % (ref 0–1.1)
BILIRUB SERPL-MCNC: 0.7 MG/DL (ref 0.1–1.2)
BUN BLD-MCNC: 24 MG/DL (ref 6–20)
BUN/CREAT SERPL: 21.2 (ref 7.3–30)
CALCIUM SPEC-SCNC: 10.1 MG/DL (ref 8.5–10.2)
CHLORIDE SERPL-SCNC: 99 MMOL/L (ref 98–107)
CO2 SERPL-SCNC: 27.6 MMOL/L (ref 22–29)
CREAT BLD-MCNC: 1.13 MG/DL (ref 0.7–1.3)
DEPRECATED RDW RBC AUTO: 43.1 FL (ref 37–49)
EOSINOPHIL # BLD AUTO: 0.2 10*3/MM3 (ref 0–0.36)
EOSINOPHIL NFR BLD AUTO: 2.1 % (ref 1–5)
ERYTHROCYTE [DISTWIDTH] IN BLOOD BY AUTOMATED COUNT: 13.3 % (ref 11.7–14.5)
GFR SERPL CREATININE-BSD FRML MDRD: 64 ML/MIN/1.73
GLOBULIN UR ELPH-MCNC: 3.1 GM/DL (ref 1.8–3.5)
GLUCOSE BLD-MCNC: 89 MG/DL (ref 74–124)
HCT VFR BLD AUTO: 49.4 % (ref 40–49)
HGB BLD-MCNC: 16.3 G/DL (ref 13.5–16.5)
IMM GRANULOCYTES # BLD: 0.09 10*3/MM3 (ref 0–0.03)
IMM GRANULOCYTES NFR BLD: 0.9 % (ref 0–0.5)
LYMPHOCYTES # BLD AUTO: 1.83 10*3/MM3 (ref 1–3.5)
LYMPHOCYTES NFR BLD AUTO: 19.1 % (ref 20–49)
MCH RBC QN AUTO: 29.1 PG (ref 27–33)
MCHC RBC AUTO-ENTMCNC: 33 G/DL (ref 32–35)
MCV RBC AUTO: 88.1 FL (ref 83–97)
MONOCYTES # BLD AUTO: 1.36 10*3/MM3 (ref 0.25–0.8)
MONOCYTES NFR BLD AUTO: 14.2 % (ref 4–12)
NEUTROPHILS # BLD AUTO: 6 10*3/MM3 (ref 1.5–7)
NEUTROPHILS NFR BLD AUTO: 62.4 % (ref 39–75)
NRBC BLD MANUAL-RTO: 0 /100 WBC (ref 0–0)
PLATELET # BLD AUTO: 273 10*3/MM3 (ref 150–375)
PMV BLD AUTO: 8.9 FL (ref 8.9–12.1)
POTASSIUM BLD-SCNC: 4.7 MMOL/L (ref 3.5–4.7)
PROT SERPL-MCNC: 7.5 G/DL (ref 6.3–8)
RBC # BLD AUTO: 5.61 10*6/MM3 (ref 4.3–5.5)
SODIUM BLD-SCNC: 138 MMOL/L (ref 134–145)
WBC NRBC COR # BLD: 9.6 10*3/MM3 (ref 4–10)

## 2018-06-19 PROCEDURE — 80053 COMPREHEN METABOLIC PANEL: CPT | Performed by: INTERNAL MEDICINE

## 2018-06-19 PROCEDURE — 99195 PHLEBOTOMY: CPT | Performed by: INTERNAL MEDICINE

## 2018-06-19 PROCEDURE — 99214 OFFICE O/P EST MOD 30 MIN: CPT | Performed by: INTERNAL MEDICINE

## 2018-06-19 PROCEDURE — 85025 COMPLETE CBC W/AUTO DIFF WBC: CPT | Performed by: INTERNAL MEDICINE

## 2018-06-19 PROCEDURE — 36415 COLL VENOUS BLD VENIPUNCTURE: CPT | Performed by: INTERNAL MEDICINE

## 2018-06-19 RX ORDER — SODIUM CHLORIDE 9 MG/ML
250 INJECTION, SOLUTION INTRAVENOUS ONCE
Status: CANCELLED | OUTPATIENT
Start: 2018-06-19

## 2018-06-19 RX ORDER — FLUTICASONE PROPIONATE 50 MCG
2 SPRAY, SUSPENSION (ML) NASAL AS NEEDED
COMMUNITY
End: 2019-08-20

## 2018-06-19 RX ORDER — SODIUM CHLORIDE 9 MG/ML
250 INJECTION, SOLUTION INTRAVENOUS ONCE
Status: DISCONTINUED | OUTPATIENT
Start: 2018-06-19 | End: 2018-06-19 | Stop reason: HOSPADM

## 2018-06-19 NOTE — PROGRESS NOTES
Subjective     REASON FOR FOLLOWUP:   1 Erythrocytosis left shifted oxygen dissociation curve?  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  Bilateral kidney cysts  3.  Persistent elevated carboxyhemoglobin with no obvious cause                             REQUESTING PHYSICIAN: Alessandra ANDUJAR    History of Present Illness patient 71-year-old male with erythrocytosis with persistent elevation of carboxyhemoglobin levels no obvious etiology and also left shifted P 50 suggesting a high affinity hemoglobin although carboxyhemoglobin alone can do this.  Anyway he is getting phlebotomies every 3 months and is tolerating it well and he actually feels better with less dizziness since his hematocrit was lowered.  He remains on a diuretic which probably worsens the erythrocytosis relatively and it may be better to take him off this but he will discuss this with his primary care doctor.    His repeat carboxyhemoglobin was also elevated in January despite changing his furnace and getting a CO monitor.  I cannot explain why this is still elevated!  We may need to move to every 2 month phlebotomies because of his elevated hematocrit and we will watch closely for iron deficiency    Past Medical History:   Diagnosis Date   • Hypertension    • Kidney stone    • Low hemoglobin    • Skin cancer     Left forearm    skin cancers for which he takes light treatments periodically    Past Surgical History:   Procedure Laterality Date   • COLONOSCOPY      x4   • ENDOSCOPY  1972   • HERNIA REPAIR  1995   • KIDNEY STONE SURGERY  2006   • VASECTOMY  1982   vasectomy, lithotripsy      HEME HISTORY:  patient is a 70-year-old male fairly good health except for hypertension and hypercholesterolemia and kidney stones was noted on recent blood work to have an elevated hemoglobin and hematocrit of 17/50% on 2 or 3 occasions.  Patient was referred to us for evaluation of this.  He has had some recent headaches and lightheadedness which she attributes  to his blood pressure medicines but otherwise feels well.  His note night sweats weight loss or loss of appetite or pain anywhere.  He has been on the same blood pressure medicines for many years.  On questioning him further he reports that when he was in his early 20s he works as a  and his hemoglobin was high in the 16 g range even then and he thinks this may be a long term problem that he has had.  His 2 children and 3 sisters but does not know of anybody else has an elevated hemoglobin.  He is not a smoker does not have sleep apnea and has a carbon monoxide monitoring in his home.  He has had no recent CAT scans of the abdomen no hematuria or kidney disease.  He is not taking any testosterone injections or preparations.    71-year-old male with long-standing erythrocytosis returns today to review the results of his blood work and imaging.  We did a chest x-ray because he complained of a chronic cough and this is thankfully benign except for some mild hyperinflation/COPD which is unusual as he is a nonsmoker.  Suspect the lisinopril is causing his cough.  Ultrasound of kidneys showed bilateral large kidney cysts but no masses.  His erythropoietin level was low at 5 and his, carbon monoxide level was mildly elevated 2.4 and his P 50 was left shifted suggesting a possible-high affinity hemoglobin!  His tolerating phlebotomy well and he actually felt better after the phlebotomy and therefore we will continue for the time being although I did tell him that I would repeat his carbon monoxide level to make sure it is back down as he got a new furnace for his home and this may cause some changes.  And he could get his phlebotomies at the [facility] because he has no evidence of polycythemia vera/ bone marrow disorder at this point.        Current Outpatient Prescriptions:   •  aspirin 81 MG disintegrating tablet, Take 81 mg by mouth., Disp: , Rfl:   •  atorvastatin (LIPITOR) 10 MG tablet, , Disp: , Rfl:   •   "fluticasone (FLONASE) 50 MCG/ACT nasal spray, 2 sprays into each nostril As Needed for Rhinitis., Disp: , Rfl:   •  lisinopril (PRINIVIL,ZESTRIL) 20 MG tablet, , Disp: , Rfl:   •  omega-3 acid ethyl esters (LOVAZA) 1 G capsule, , Disp: , Rfl:   •  triamterene-hydrochlorothiazide (MAXZIDE) 75-50 MG per tablet, Take 1 tablet by mouth., Disp: , Rfl:     ALLERGIES:    Allergies   Allergen Reactions   • Erythromycin    • Sulfa Antibiotics         Social History     Social History   • Marital status:      Spouse name: Radha   • Years of education: High school     Occupational History   • Electronic maintenance` Retired     [employer]     Social History Main Topics   • Smoking status: Never Smoker   • Alcohol use Yes      Comment: Occasionally   • Drug use: No     Other Topics Concern   • Not on file     does not smoke or drink no risk factors for HIV worked in [employer] has no chemicals or toxic exposure     Family History   Problem Relation Age of Onset   • Heart disease Mother    • Hypertension Mother    • Cancer Son 43        Squamous cell CA in tonsils       30-year-old son had squamous cell carcinoma of the tonsil 7 years ago and is JESSIE with no other malignancy in the family -nobody has a high hemoglobin in the family     Review of Systems   Respiratory: Positive for cough.    Endocrine: Positive for heat intolerance and polydipsia.   Genitourinary: Positive for frequency.   Skin:        itching          Objective     Vitals:    06/19/18 0903   BP: 118/80   Pulse: 67   Resp: 16   Temp: 98 °F (36.7 °C)   TempSrc: Oral   SpO2: 96%   Weight: 110 kg (243 lb 3.2 oz)   Height: 180 cm (70.87\")   PainSc: 0-No pain     Current Status 6/19/2018   ECOG score 0       Physical Exam    GENERAL:  Well-developed, well-nourished in no acute distress. Face is red  SKIN:  Warm, dry without rashes, purpura or petechiae.  HEAD:  Normocephalic.  EYES:  Pupils equal, round and reactive to light.  EOMs intact.  " Conjunctivae normal.  EARS:  Hearing intact.  NOSE:  Septum midline.  No excoriations or nasal discharge.  MOUTH:  Tongue is well-papillated; no stomatitis or ulcers.  Lips normal.  THROAT:  Oropharynx without lesions or exudates.  NECK:  Supple with good range of motion; no thyromegaly or masses, no JVD.  LYMPHATICS:  No cervical, supraclavicular, axillary or inguinal adenopathy.  CHEST:  Lungs clear to percussion and auscultation. Good airflow.  CARDIAC:  Regular rate and rhythm without murmurs, rubs or gallops. Normal S1,S2.  ABDOMEN:  Soft, nontender with no organomegaly or masses.  EXTREMITIES:  No clubbing, cyanosis or edema.  NEUROLOGICAL:  Cranial Nerves II-XII grossly intact.  No focal neurological deficits.  PSYCHIATRIC:  Normal affect and mood.        RECENT LABS:  Hematology WBC   Date Value Ref Range Status   06/19/2018 9.60 4.00 - 10.00 10*3/mm3 Final     RBC   Date Value Ref Range Status   06/19/2018 5.61 (H) 4.30 - 5.50 10*6/mm3 Final     Hemoglobin   Date Value Ref Range Status   06/19/2018 16.3 13.5 - 16.5 g/dL Final     Hematocrit   Date Value Ref Range Status   06/19/2018 49.4 (H) 40.0 - 49.0 % Final     Platelets   Date Value Ref Range Status   06/19/2018 273 150 - 375 10*3/mm3 Final        Peripheral blood smear shows normochromic normocytic red cells without polychromasia white cells show no increase in immature forms.  Occasional Dohle bodies are noted there is no eosinophilia or basophilia.  Platelets are increased but normal morphologically.    Assessment/Plan      1.  Isolated Erythrocytosis without leukocytosis or thrombocytosis- --the long duration of his symptoms suggest possibly a high affinity hemoglobin which is backed up by the left shifted P 50  2.  headaches and dizziness possibly related to erythrocytosis-improved after phlebotomy  3.  Cough etiology unclear -possibly from lisinopril-Negative chest x-ray    plan   1. one unit phlebotomy today -would like to keep his hematocrit  below 47%  2.  Return Q 2 months phlebotomy and M.D. visit in 6 months  He may benefit from avoiding a diuretic in terms of his hematocrit and he will discuss this with Dr. Aparicio

## 2018-08-21 ENCOUNTER — INFUSION (OUTPATIENT)
Dept: ONCOLOGY | Facility: HOSPITAL | Age: 73
End: 2018-08-21

## 2018-08-21 ENCOUNTER — LAB (OUTPATIENT)
Dept: LAB | Facility: HOSPITAL | Age: 73
End: 2018-08-21

## 2018-08-21 VITALS
BODY MASS INDEX: 34.94 KG/M2 | DIASTOLIC BLOOD PRESSURE: 73 MMHG | HEART RATE: 86 BPM | WEIGHT: 249.6 LBS | TEMPERATURE: 97.9 F | SYSTOLIC BLOOD PRESSURE: 131 MMHG

## 2018-08-21 DIAGNOSIS — D75.1 ERYTHROCYTOSIS: Primary | ICD-10-CM

## 2018-08-21 DIAGNOSIS — D75.1 ERYTHROCYTOSIS: ICD-10-CM

## 2018-08-21 LAB
BASOPHILS # BLD AUTO: 0.1 10*3/MM3 (ref 0–0.1)
BASOPHILS NFR BLD AUTO: 1.3 % (ref 0–1.1)
DEPRECATED RDW RBC AUTO: 40.1 FL (ref 37–49)
EOSINOPHIL # BLD AUTO: 0.17 10*3/MM3 (ref 0–0.36)
EOSINOPHIL NFR BLD AUTO: 2.2 % (ref 1–5)
ERYTHROCYTE [DISTWIDTH] IN BLOOD BY AUTOMATED COUNT: 13 % (ref 11.7–14.5)
HCT VFR BLD AUTO: 48.4 % (ref 40–49)
HGB BLD-MCNC: 16.4 G/DL (ref 13.5–16.5)
IMM GRANULOCYTES # BLD: 0.1 10*3/MM3 (ref 0–0.03)
IMM GRANULOCYTES NFR BLD: 1.3 % (ref 0–0.5)
LYMPHOCYTES # BLD AUTO: 1.49 10*3/MM3 (ref 1–3.5)
LYMPHOCYTES NFR BLD AUTO: 18.9 % (ref 20–49)
MCH RBC QN AUTO: 29 PG (ref 27–33)
MCHC RBC AUTO-ENTMCNC: 33.9 G/DL (ref 32–35)
MCV RBC AUTO: 85.7 FL (ref 83–97)
MONOCYTES # BLD AUTO: 1.55 10*3/MM3 (ref 0.25–0.8)
MONOCYTES NFR BLD AUTO: 19.7 % (ref 4–12)
NEUTROPHILS # BLD AUTO: 4.46 10*3/MM3 (ref 1.5–7)
NEUTROPHILS NFR BLD AUTO: 56.6 % (ref 39–75)
NRBC BLD MANUAL-RTO: 0 /100 WBC (ref 0–0)
PLATELET # BLD AUTO: 240 10*3/MM3 (ref 150–375)
PMV BLD AUTO: 9.4 FL (ref 8.9–12.1)
RBC # BLD AUTO: 5.65 10*6/MM3 (ref 4.3–5.5)
WBC NRBC COR # BLD: 7.87 10*3/MM3 (ref 4–10)

## 2018-08-21 PROCEDURE — 99195 PHLEBOTOMY: CPT | Performed by: INTERNAL MEDICINE

## 2018-08-21 PROCEDURE — 85025 COMPLETE CBC W/AUTO DIFF WBC: CPT | Performed by: INTERNAL MEDICINE

## 2018-08-21 PROCEDURE — 36415 COLL VENOUS BLD VENIPUNCTURE: CPT | Performed by: INTERNAL MEDICINE

## 2018-08-21 RX ORDER — SODIUM CHLORIDE 9 MG/ML
250 INJECTION, SOLUTION INTRAVENOUS ONCE
Status: CANCELLED | OUTPATIENT
Start: 2018-08-21

## 2018-08-21 RX ORDER — SODIUM CHLORIDE 9 MG/ML
250 INJECTION, SOLUTION INTRAVENOUS ONCE
Status: DISCONTINUED | OUTPATIENT
Start: 2018-08-21 | End: 2018-08-21 | Stop reason: HOSPADM

## 2018-08-21 NOTE — PROGRESS NOTES
Blood flow stopped at 350 cc.  Patient does not wish to be stuck again.  VSS.    Patient discharged.  Hct 48.4.  Phlebo for Hct 47 or greater.

## 2018-10-03 ENCOUNTER — TELEPHONE (OUTPATIENT)
Dept: ONCOLOGY | Facility: HOSPITAL | Age: 73
End: 2018-10-03

## 2018-10-03 NOTE — TELEPHONE ENCOUNTER
Patient calling about appointment on 10/23/18 for lab work and possible phlebo.  Pt states 10 days ago he had emergency stent put in at [facility] and was put on blood thinners, plavix , and new blood pressure meds started  Pt wants to know if it is okay to cancel appointment for October and wait to see Dr Andino in December .  Pt states blood counts were monitored at [facility] and he believes they were okay.  Pt has appointment with cardiologist next week   Advised patient we would go ahead and cancel appointment for October 23 and to keep appointment with Dr Andino in December  Pt states he will also discuss with his cardiologist next week

## 2018-10-03 NOTE — TELEPHONE ENCOUNTER
----- Message from Ewelina Barton RN sent at 10/3/2018  3:40 PM EDT -----  Please cancel pt appointment for Oct 23, 2018  Thanks

## 2018-12-13 ENCOUNTER — OFFICE VISIT (OUTPATIENT)
Dept: ONCOLOGY | Facility: CLINIC | Age: 73
End: 2018-12-13

## 2018-12-13 ENCOUNTER — LAB (OUTPATIENT)
Dept: LAB | Facility: HOSPITAL | Age: 73
End: 2018-12-13

## 2018-12-13 ENCOUNTER — INFUSION (OUTPATIENT)
Dept: ONCOLOGY | Facility: HOSPITAL | Age: 73
End: 2018-12-13

## 2018-12-13 VITALS
HEIGHT: 71 IN | BODY MASS INDEX: 34.83 KG/M2 | DIASTOLIC BLOOD PRESSURE: 60 MMHG | HEART RATE: 66 BPM | SYSTOLIC BLOOD PRESSURE: 118 MMHG | TEMPERATURE: 98.3 F | OXYGEN SATURATION: 94 % | RESPIRATION RATE: 18 BRPM | WEIGHT: 248.8 LBS

## 2018-12-13 DIAGNOSIS — D75.1 ERYTHROCYTOSIS: Primary | ICD-10-CM

## 2018-12-13 DIAGNOSIS — D75.1 ERYTHROCYTOSIS: ICD-10-CM

## 2018-12-13 LAB
ALBUMIN SERPL-MCNC: 4.4 G/DL (ref 3.5–5.2)
ALBUMIN/GLOB SERPL: 1.7 G/DL (ref 1.1–2.4)
ALP SERPL-CCNC: 65 U/L (ref 38–116)
ALT SERPL W P-5'-P-CCNC: 18 U/L (ref 0–41)
ANION GAP SERPL CALCULATED.3IONS-SCNC: 11.8 MMOL/L
AST SERPL-CCNC: 20 U/L (ref 0–40)
BASOPHILS # BLD AUTO: 0.12 10*3/MM3 (ref 0–0.1)
BASOPHILS NFR BLD AUTO: 1.7 % (ref 0–1.1)
BILIRUB SERPL-MCNC: 0.9 MG/DL (ref 0.1–1.2)
BUN BLD-MCNC: 20 MG/DL (ref 6–20)
BUN/CREAT SERPL: 20.4 (ref 7.3–30)
CALCIUM SPEC-SCNC: 10 MG/DL (ref 8.5–10.2)
CHLORIDE SERPL-SCNC: 105 MMOL/L (ref 98–107)
CO2 SERPL-SCNC: 26.2 MMOL/L (ref 22–29)
CREAT BLD-MCNC: 0.98 MG/DL (ref 0.7–1.3)
DEPRECATED RDW RBC AUTO: 45 FL (ref 37–49)
EOSINOPHIL # BLD AUTO: 0.19 10*3/MM3 (ref 0–0.36)
EOSINOPHIL NFR BLD AUTO: 2.6 % (ref 1–5)
ERYTHROCYTE [DISTWIDTH] IN BLOOD BY AUTOMATED COUNT: 13.9 % (ref 11.7–14.5)
GFR SERPL CREATININE-BSD FRML MDRD: 75 ML/MIN/1.73
GLOBULIN UR ELPH-MCNC: 2.6 GM/DL (ref 1.8–3.5)
GLUCOSE BLD-MCNC: 89 MG/DL (ref 74–124)
HCT VFR BLD AUTO: 50.7 % (ref 40–49)
HGB BLD-MCNC: 16.6 G/DL (ref 13.5–16.5)
IMM GRANULOCYTES # BLD: 0.06 10*3/MM3 (ref 0–0.03)
IMM GRANULOCYTES NFR BLD: 0.8 % (ref 0–0.5)
LYMPHOCYTES # BLD AUTO: 1.65 10*3/MM3 (ref 1–3.5)
LYMPHOCYTES NFR BLD AUTO: 23 % (ref 20–49)
MCH RBC QN AUTO: 28.7 PG (ref 27–33)
MCHC RBC AUTO-ENTMCNC: 32.7 G/DL (ref 32–35)
MCV RBC AUTO: 87.7 FL (ref 83–97)
MONOCYTES # BLD AUTO: 1.03 10*3/MM3 (ref 0.25–0.8)
MONOCYTES NFR BLD AUTO: 14.3 % (ref 4–12)
NEUTROPHILS # BLD AUTO: 4.13 10*3/MM3 (ref 1.5–7)
NEUTROPHILS NFR BLD AUTO: 57.6 % (ref 39–75)
NRBC BLD MANUAL-RTO: 0 /100 WBC (ref 0–0)
PLATELET # BLD AUTO: 253 10*3/MM3 (ref 150–375)
PMV BLD AUTO: 9.3 FL (ref 8.9–12.1)
POTASSIUM BLD-SCNC: 4.7 MMOL/L (ref 3.5–4.7)
PROT SERPL-MCNC: 7 G/DL (ref 6.3–8)
RBC # BLD AUTO: 5.78 10*6/MM3 (ref 4.3–5.5)
SODIUM BLD-SCNC: 143 MMOL/L (ref 134–145)
WBC NRBC COR # BLD: 7.18 10*3/MM3 (ref 4–10)

## 2018-12-13 PROCEDURE — 99214 OFFICE O/P EST MOD 30 MIN: CPT | Performed by: INTERNAL MEDICINE

## 2018-12-13 PROCEDURE — 80053 COMPREHEN METABOLIC PANEL: CPT | Performed by: INTERNAL MEDICINE

## 2018-12-13 PROCEDURE — 36415 COLL VENOUS BLD VENIPUNCTURE: CPT | Performed by: INTERNAL MEDICINE

## 2018-12-13 PROCEDURE — 85025 COMPLETE CBC W/AUTO DIFF WBC: CPT | Performed by: INTERNAL MEDICINE

## 2018-12-13 PROCEDURE — 99195 PHLEBOTOMY: CPT | Performed by: INTERNAL MEDICINE

## 2018-12-13 RX ORDER — POLYETHYLENE GLYCOL 3350 17 G/17G
17 POWDER, FOR SOLUTION ORAL DAILY
COMMUNITY

## 2018-12-13 RX ORDER — NITROGLYCERIN 0.4 MG/1
0.4 TABLET SUBLINGUAL
COMMUNITY

## 2018-12-13 RX ORDER — SODIUM CHLORIDE 9 MG/ML
250 INJECTION, SOLUTION INTRAVENOUS ONCE
Status: CANCELLED | OUTPATIENT
Start: 2018-12-13

## 2018-12-13 RX ORDER — METOPROLOL SUCCINATE 25 MG
25 TABLET, EXTENDED RELEASE 24 HR ORAL DAILY
COMMUNITY
End: 2022-11-27

## 2018-12-13 RX ORDER — CLOPIDOGREL BISULFATE 75 MG/1
TABLET ORAL
COMMUNITY
Start: 2018-12-12 | End: 2021-08-04

## 2018-12-13 NOTE — PROGRESS NOTES
Subjective     REASON FOR FOLLOWUP:   1 Erythrocytosis left shifted oxygen dissociation curve?  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  Bilateral kidney cysts  3.  Persistent elevated carboxyhemoglobin with no obvious cause                             REQUESTING PHYSICIAN: Alessandra ANDUJAR    History of Present Illness patient 71-year-old male with erythrocytosis with persistent elevation of carboxyhemoglobin levels no obvious etiology and also left shifted P 50 suggesting a high affinity hemoglobin although carboxyhemoglobin alone can do this.  Anyway he is getting phlebotomies every 2 months and is tolerating it weli but unfortunately had a heart attack in September with a hematocrit of 40% and therefore we will resume phlebotomy trying to keep him below 46%    Is otherwise doing well and going to cardiac rehabilitation.  One stent placed and is currently on an anticoagulant which she inadvertently thought would remove the need for phlebotomy and I explained it would not     his family doctor stopped his diuretics to see if this would improve his hematocrit but it did not unfortunately      Past Medical History:   Diagnosis Date   • Hypertension    • Kidney stone    • Low hemoglobin    • Myocardial infarction (CMS/HCC) 09/22/2018    placed one stent   • Skin cancer     Left forearm    skin cancers for which he takes light treatments periodically    Past Surgical History:   Procedure Laterality Date   • COLONOSCOPY      x4   • ENDOSCOPY  1972   • HERNIA REPAIR  1995   • KIDNEY STONE SURGERY  2006   • VASECTOMY  1982   vasectomy, lithotripsy      HEME HISTORY:  patient is a 70-year-old male fairly good health except for hypertension and hypercholesterolemia and kidney stones was noted on recent blood work to have an elevated hemoglobin and hematocrit of 17/50% on 2 or 3 occasions.  Patient was referred to us for evaluation of this.  He has had some recent headaches and lightheadedness which she attributes to his  blood pressure medicines but otherwise feels well.  His note night sweats weight loss or loss of appetite or pain anywhere.  He has been on the same blood pressure medicines for many years.  On questioning him further he reports that when he was in his early 20s he works as a  and his hemoglobin was high in the 16 g range even then and he thinks this may be a long term problem that he has had.  His 2 children and 3 sisters but does not know of anybody else has an elevated hemoglobin.  He is not a smoker does not have sleep apnea and has a carbon monoxide monitoring in his home.  He has had no recent CAT scans of the abdomen no hematuria or kidney disease.  He is not taking any testosterone injections or preparations.    71-year-old male with long-standing erythrocytosis returns today to review the results of his blood work and imaging.  We did a chest x-ray because he complained of a chronic cough and this is thankfully benign except for some mild hyperinflation/COPD which is unusual as he is a nonsmoker.  Suspect the lisinopril is causing his cough.  Ultrasound of kidneys showed bilateral large kidney cysts but no masses.  His erythropoietin level was low at 5 and his, carbon monoxide level was mildly elevated 2.4 and his P 50 was left shifted suggesting a possible-high affinity hemoglobin!  His tolerating phlebotomy well and he actually felt better after the phlebotomy and therefore we will continue for the time being although I did tell him that I would repeat his carbon monoxide level to make sure it is back down as he got a new furnace for his home and this may cause some changes.  And he could get his phlebotomies at the [facility] because he has no evidence of polycythemia vera/ bone marrow disorder at this point.        Current Outpatient Medications:   •  aspirin 81 MG disintegrating tablet, Take 81 mg by mouth., Disp: , Rfl:   •  atorvastatin (LIPITOR) 40 MG tablet, 40 mg., Disp: , Rfl:   •   clopidogrel (PLAVIX) 75 MG tablet, , Disp: , Rfl:   •  fluticasone (FLONASE) 50 MCG/ACT nasal spray, 2 sprays into each nostril As Needed for Rhinitis., Disp: , Rfl:   •  lisinopril (PRINIVIL,ZESTRIL) 2.5 MG tablet, , Disp: , Rfl:   •  metoprolol succinate XL (TOPROL-XL) 12.5 MG 24 hr half tablet, Take 25 mg by mouth Daily., Disp: , Rfl:   •  nitroglycerin (NITROSTAT) 0.4 MG SL tablet, Place 0.4 mg under the tongue Every 5 (Five) Minutes As Needed for Chest Pain. Take no more than 3 doses in 15 minutes., Disp: , Rfl:   •  omega-3 acid ethyl esters (LOVAZA) 1 G capsule, , Disp: , Rfl:   •  triamterene-hydrochlorothiazide (MAXZIDE) 75-50 MG per tablet, Take 1 tablet by mouth., Disp: , Rfl:     ALLERGIES:    Allergies   Allergen Reactions   • Erythromycin    • Sulfa Antibiotics    • Amoxicillin-Pot Clavulanate GI Intolerance     Stomach cramping--amoxicillin is OK          Social History     Socioeconomic History   • Marital status:      Spouse name: Radha   • Number of children: Not on file   • Years of education: High school   • Highest education level: Not on file   Occupational History   • Occupation: [employer] maintenance`     Employer: RETIRED     Comment: [employer]   Tobacco Use   • Smoking status: Never Smoker   Substance and Sexual Activity   • Alcohol use: Yes     Comment: Occasionally   • Drug use: No   • Sexual activity: Defer     does not smoke or drink no risk factors for HIV worked in [place] [employer] has no chemicals or toxic exposure     Family History   Problem Relation Age of Onset   • Heart disease Mother    • Hypertension Mother    • Cancer Son 43        Squamous cell CA in tonsils       30-year-old son had squamous cell carcinoma of the tonsil 7 years ago and is JESSIE with no other malignancy in the family -nobody has a high hemoglobin in the family     Review of Systems   Constitutional: Negative for chills and fever.   HENT: Negative for congestion and mouth sores.   "  Respiratory: Negative for cough, chest tightness and shortness of breath.    Cardiovascular: Negative for chest pain.   Gastrointestinal: Positive for blood in stool (better 12/13/18). Negative for abdominal pain, constipation, diarrhea, nausea and vomiting.   Endocrine: Negative for heat intolerance and polydipsia.   Genitourinary: Positive for frequency (better 12/13/18).   Skin:        itching     Neurological: Negative for dizziness and headaches.   Psychiatric/Behavioral: The patient is not nervous/anxious.         Objective     Vitals:    12/13/18 1105   Height: 180 cm (70.87\")   PainSc: 0-No pain     Current Status 12/13/2018   ECOG score 0       Physical Exam    GENERAL:  Well-developed, well-nourished in no acute distress. Face is red  SKIN:  Warm, dry without rashes, purpura or petechiae.  HEAD:  Normocephalic.  EYES:  Pupils equal, round and reactive to light.  EOMs intact.  Conjunctivae normal.  EARS:  Hearing intact.  NOSE:  Septum midline.  No excoriations or nasal discharge.  MOUTH:  Tongue is well-papillated; no stomatitis or ulcers.  Lips normal.  THROAT:  Oropharynx without lesions or exudates.  NECK:  Supple with good range of motion; no thyromegaly or masses, no JVD.  LYMPHATICS:  No cervical, supraclavicular, axillary or inguinal adenopathy.  CHEST:  Lungs clear to percussion and auscultation. Good airflow.  CARDIAC:  Regular rate and rhythm without murmurs, rubs or gallops. Normal S1,S2.  ABDOMEN:  Soft, nontender with no organomegaly or masses.  EXTREMITIES:  No clubbing, cyanosis or edema.  NEUROLOGICAL:  Cranial Nerves II-XII grossly intact.  No focal neurological deficits.  PSYCHIATRIC:  Normal affect and mood.        RECENT LABS:  Hematology WBC   Date Value Ref Range Status   12/13/2018 7.18 4.00 - 10.00 10*3/mm3 Final     RBC   Date Value Ref Range Status   12/13/2018 5.78 (H) 4.30 - 5.50 10*6/mm3 Final     Hemoglobin   Date Value Ref Range Status   12/13/2018 16.6 (H) 13.5 - 16.5 g/dL " Final     Hematocrit   Date Value Ref Range Status   12/13/2018 50.7 (H) 40.0 - 49.0 % Final     Platelets   Date Value Ref Range Status   12/13/2018 253 150 - 375 10*3/mm3 Final        Peripheral blood smear shows normochromic normocytic red cells without polychromasia white cells show no increase in immature forms.  Occasional Dohle bodies are noted there is no eosinophilia or basophilia.  Platelets are increased but normal morphologically.    Assessment/Plan      1.  Isolated Erythrocytosis without leukocytosis or thrombocytosis- --the long duration of his symptoms suggest possibly a high affinity hemoglobin which is backed up by the left shifted P 50  2.  headaches and dizziness possibly related to erythrocytosis-improved after phlebotomy  3.  Cough etiology unclear -possibly from lisinopril-Negative chest x-ray    plan   1. one unit phlebotomy today -would like to keep his hematocrit below 46%  2.  Return Q 2 months phlebotomy and NP in 4 months and M.D. visit in 8 month

## 2019-02-12 RX ORDER — SODIUM CHLORIDE 9 MG/ML
250 INJECTION, SOLUTION INTRAVENOUS ONCE
Status: CANCELLED | OUTPATIENT
Start: 2019-02-13

## 2019-02-13 ENCOUNTER — LAB (OUTPATIENT)
Dept: LAB | Facility: HOSPITAL | Age: 74
End: 2019-02-13

## 2019-02-13 ENCOUNTER — INFUSION (OUTPATIENT)
Dept: ONCOLOGY | Facility: HOSPITAL | Age: 74
End: 2019-02-13

## 2019-02-13 VITALS
HEART RATE: 59 BPM | DIASTOLIC BLOOD PRESSURE: 71 MMHG | WEIGHT: 255.8 LBS | BODY MASS INDEX: 35.81 KG/M2 | SYSTOLIC BLOOD PRESSURE: 127 MMHG

## 2019-02-13 DIAGNOSIS — D75.1 ERYTHROCYTOSIS: ICD-10-CM

## 2019-02-13 DIAGNOSIS — D75.1 ERYTHROCYTOSIS: Primary | ICD-10-CM

## 2019-02-13 LAB
BASOPHILS # BLD AUTO: 0.09 10*3/MM3 (ref 0–0.2)
BASOPHILS NFR BLD AUTO: 1 % (ref 0–1.5)
DEPRECATED RDW RBC AUTO: 42 FL (ref 37–54)
EOSINOPHIL # BLD AUTO: 0.2 10*3/MM3 (ref 0–0.4)
EOSINOPHIL NFR BLD AUTO: 2.1 % (ref 0.3–6.2)
ERYTHROCYTE [DISTWIDTH] IN BLOOD BY AUTOMATED COUNT: 13.8 % (ref 12.3–15.4)
HCT VFR BLD AUTO: 46.3 % (ref 37.5–51)
HGB BLD-MCNC: 15.8 G/DL (ref 13–17.7)
IMM GRANULOCYTES # BLD AUTO: 0.06 10*3/MM3 (ref 0–0.05)
IMM GRANULOCYTES NFR BLD AUTO: 0.6 % (ref 0–0.5)
LYMPHOCYTES # BLD AUTO: 1.95 10*3/MM3 (ref 0.7–3.1)
LYMPHOCYTES NFR BLD AUTO: 20.9 % (ref 19.6–45.3)
MCH RBC QN AUTO: 28.5 PG (ref 26.6–33)
MCHC RBC AUTO-ENTMCNC: 34.1 G/DL (ref 31.5–35.7)
MCV RBC AUTO: 83.4 FL (ref 79–97)
MONOCYTES # BLD AUTO: 1.24 10*3/MM3 (ref 0.1–0.9)
MONOCYTES NFR BLD AUTO: 13.3 % (ref 5–12)
NEUTROPHILS # BLD AUTO: 5.81 10*3/MM3 (ref 1.4–7)
NEUTROPHILS NFR BLD AUTO: 62.1 % (ref 42.7–76)
NRBC BLD AUTO-RTO: 0 /100 WBC (ref 0–0)
PLATELET # BLD AUTO: 286 10*3/MM3 (ref 140–450)
PMV BLD AUTO: 9.6 FL (ref 6–12)
RBC # BLD AUTO: 5.55 10*6/MM3 (ref 4.14–5.8)
WBC NRBC COR # BLD: 9.35 10*3/MM3 (ref 3.4–10.8)

## 2019-02-13 PROCEDURE — 85025 COMPLETE CBC W/AUTO DIFF WBC: CPT | Performed by: INTERNAL MEDICINE

## 2019-02-13 PROCEDURE — 36415 COLL VENOUS BLD VENIPUNCTURE: CPT | Performed by: INTERNAL MEDICINE

## 2019-02-13 RX ORDER — SODIUM CHLORIDE 9 MG/ML
250 INJECTION, SOLUTION INTRAVENOUS ONCE
Status: DISCONTINUED | OUTPATIENT
Start: 2019-02-13 | End: 2019-02-13 | Stop reason: HOSPADM

## 2019-02-13 RX ORDER — SODIUM CHLORIDE 9 MG/ML
250 INJECTION, SOLUTION INTRAVENOUS ONCE
Status: CANCELLED | OUTPATIENT
Start: 2019-02-13

## 2019-04-12 ENCOUNTER — LAB (OUTPATIENT)
Dept: LAB | Facility: HOSPITAL | Age: 74
End: 2019-04-12

## 2019-04-12 ENCOUNTER — OFFICE VISIT (OUTPATIENT)
Dept: ONCOLOGY | Facility: CLINIC | Age: 74
End: 2019-04-12

## 2019-04-12 ENCOUNTER — INFUSION (OUTPATIENT)
Dept: ONCOLOGY | Facility: HOSPITAL | Age: 74
End: 2019-04-12

## 2019-04-12 VITALS
TEMPERATURE: 98.1 F | RESPIRATION RATE: 16 BRPM | DIASTOLIC BLOOD PRESSURE: 68 MMHG | BODY MASS INDEX: 36.2 KG/M2 | HEART RATE: 56 BPM | WEIGHT: 258.6 LBS | SYSTOLIC BLOOD PRESSURE: 128 MMHG | OXYGEN SATURATION: 93 % | HEIGHT: 71 IN

## 2019-04-12 DIAGNOSIS — D75.1 ERYTHROCYTOSIS: ICD-10-CM

## 2019-04-12 DIAGNOSIS — D75.1 ERYTHROCYTOSIS: Primary | ICD-10-CM

## 2019-04-12 LAB
BASOPHILS # BLD AUTO: 0.11 10*3/MM3 (ref 0–0.2)
BASOPHILS NFR BLD AUTO: 1.2 % (ref 0–1.5)
DEPRECATED RDW RBC AUTO: 43.4 FL (ref 37–54)
EOSINOPHIL # BLD AUTO: 0.24 10*3/MM3 (ref 0–0.4)
EOSINOPHIL NFR BLD AUTO: 2.7 % (ref 0.3–6.2)
ERYTHROCYTE [DISTWIDTH] IN BLOOD BY AUTOMATED COUNT: 14.3 % (ref 12.3–15.4)
HCT VFR BLD AUTO: 47.4 % (ref 37.5–51)
HGB BLD-MCNC: 15.6 G/DL (ref 13–17.7)
IMM GRANULOCYTES # BLD AUTO: 0.07 10*3/MM3 (ref 0–0.05)
IMM GRANULOCYTES NFR BLD AUTO: 0.8 % (ref 0–0.5)
LYMPHOCYTES # BLD AUTO: 2.04 10*3/MM3 (ref 0.7–3.1)
LYMPHOCYTES NFR BLD AUTO: 22.8 % (ref 19.6–45.3)
MCH RBC QN AUTO: 27.7 PG (ref 26.6–33)
MCHC RBC AUTO-ENTMCNC: 32.9 G/DL (ref 31.5–35.7)
MCV RBC AUTO: 84 FL (ref 79–97)
MONOCYTES # BLD AUTO: 1.24 10*3/MM3 (ref 0.1–0.9)
MONOCYTES NFR BLD AUTO: 13.9 % (ref 5–12)
NEUTROPHILS # BLD AUTO: 5.24 10*3/MM3 (ref 1.4–7)
NEUTROPHILS NFR BLD AUTO: 58.6 % (ref 42.7–76)
NRBC BLD AUTO-RTO: 0 /100 WBC (ref 0–0)
PLATELET # BLD AUTO: 250 10*3/MM3 (ref 140–450)
PMV BLD AUTO: 9.5 FL (ref 6–12)
RBC # BLD AUTO: 5.64 10*6/MM3 (ref 4.14–5.8)
WBC NRBC COR # BLD: 8.94 10*3/MM3 (ref 3.4–10.8)

## 2019-04-12 PROCEDURE — 36416 COLLJ CAPILLARY BLOOD SPEC: CPT | Performed by: INTERNAL MEDICINE

## 2019-04-12 PROCEDURE — 99213 OFFICE O/P EST LOW 20 MIN: CPT | Performed by: NURSE PRACTITIONER

## 2019-04-12 PROCEDURE — 85025 COMPLETE CBC W/AUTO DIFF WBC: CPT | Performed by: INTERNAL MEDICINE

## 2019-04-12 PROCEDURE — 99195 PHLEBOTOMY: CPT | Performed by: NURSE PRACTITIONER

## 2019-04-12 RX ORDER — SODIUM CHLORIDE 9 MG/ML
250 INJECTION, SOLUTION INTRAVENOUS ONCE
Status: CANCELLED | OUTPATIENT
Start: 2019-04-12

## 2019-04-12 NOTE — PROGRESS NOTES
Subjective     REASON FOR FOLLOWUP:   1 Erythrocytosis left shifted oxygen dissociation curve?  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  Bilateral kidney cysts  3.  Persistent elevated carboxyhemoglobin with no obvious cause                             REQUESTING PHYSICIAN: Alessandra ANDUJAR    History of Present Illness patient is a 73-year-old male with the above-mentioned history here today for follow-up.  He reports that he has been feeling well.  He continues to do cardiac rehabilitation 3 days a week.  He denies any headaches or dizzy spells.  He continues on an 81 mg aspirin daily.      Past Medical History:   Diagnosis Date   • Hypertension    • Kidney stone    • Low hemoglobin    • Myocardial infarction (CMS/HCC) 09/22/2018    placed one stent   • Skin cancer     Left forearm    skin cancers for which he takes light treatments periodically    Past Surgical History:   Procedure Laterality Date   • COLONOSCOPY      x4   • ENDOSCOPY  1972   • HERNIA REPAIR  1995   • KIDNEY STONE SURGERY  2006   • VASECTOMY  1982        HEME HISTORY:  patient is a 70-year-old male fairly good health except for hypertension and hypercholesterolemia and kidney stones was noted on recent blood work to have an elevated hemoglobin and hematocrit of 17/50% on 2 or 3 occasions.  Patient was referred to us for evaluation of this.  He has had some recent headaches and lightheadedness which she attributes to his blood pressure medicines but otherwise feels well.  His note night sweats weight loss or loss of appetite or pain anywhere.  He has been on the same blood pressure medicines for many years.  On questioning him further he reports that when he was in his early 20s he works as a  and his hemoglobin was high in the 16 g range even then and he thinks this may be a long term problem that he has had.  His 2 children and 3 sisters but does not know of anybody else has an elevated hemoglobin.  He is not a smoker does not have  sleep apnea and has a carbon monoxide monitoring in his home.  He has had no recent CAT scans of the abdomen no hematuria or kidney disease.  He is not taking any testosterone injections or preparations.    71-year-old male with long-standing erythrocytosis returns today to review the results of his blood work and imaging.  We did a chest x-ray because he complained of a chronic cough and this is thankfully benign except for some mild hyperinflation/COPD which is unusual as he is a nonsmoker.  Suspect the lisinopril is causing his cough.  Ultrasound of kidneys showed bilateral large kidney cysts but no masses.  His erythropoietin level was low at 5 and his, carbon monoxide level was mildly elevated 2.4 and his P 50 was left shifted suggesting a possible-high affinity hemoglobin!  His tolerating phlebotomy well and he actually felt better after the phlebotomy and therefore we will continue for the time being although I did tell him that I would repeat his carbon monoxide level to make sure it is back down as he got a new furnace for his home and this may cause some changes.  And he could get his phlebotomies at the [facility] because he has no evidence of polycythemia vera/ bone marrow disorder at this point.        Current Outpatient Medications:   •  aspirin 81 MG disintegrating tablet, Take 81 mg by mouth., Disp: , Rfl:   •  atorvastatin (LIPITOR) 40 MG tablet, 40 mg., Disp: , Rfl:   •  clopidogrel (PLAVIX) 75 MG tablet, , Disp: , Rfl:   •  fluticasone (FLONASE) 50 MCG/ACT nasal spray, 2 sprays into each nostril As Needed for Rhinitis., Disp: , Rfl:   •  lisinopril (PRINIVIL,ZESTRIL) 2.5 MG tablet, 10 mg., Disp: , Rfl:   •  metoprolol succinate XL (TOPROL-XL) 12.5 MG 24 hr half tablet, Take 25 mg by mouth Daily., Disp: , Rfl:   •  nitroglycerin (NITROSTAT) 0.4 MG SL tablet, Place 0.4 mg under the tongue Every 5 (Five) Minutes As Needed for Chest Pain. Take no more than 3 doses in 15 minutes., Disp: , Rfl:   •   omega-3 acid ethyl esters (LOVAZA) 1 G capsule, , Disp: , Rfl:   •  polyethylene glycol (MIRALAX) packet, Take 17 g by mouth Daily., Disp: , Rfl:   •  triamterene-hydrochlorothiazide (MAXZIDE) 75-50 MG per tablet, Take 1 tablet by mouth., Disp: , Rfl:     ALLERGIES:    Allergies   Allergen Reactions   • Erythromycin    • Sulfa Antibiotics    • Amoxicillin-Pot Clavulanate GI Intolerance     Stomach cramping--amoxicillin is OK          Social History     Socioeconomic History   • Marital status:      Spouse name: Radha   • Number of children: Not on file   • Years of education: High school   • Highest education level: Not on file   Occupational History   • Occupation: [employer] maintenance`     Employer: RETIRED     Comment: [email or web address]   Tobacco Use   • Smoking status: Never Smoker   Substance and Sexual Activity   • Alcohol use: Yes     Comment: Occasionally   • Drug use: No   • Sexual activity: Defer     does not smoke or drink no risk factors for HIV worked in [place] [employer] has no chemicals or toxic exposure     Family History   Problem Relation Age of Onset   • Heart disease Mother    • Hypertension Mother    • Cancer Son 43        Squamous cell CA in tonsils       30-year-old son had squamous cell carcinoma of the tonsil 7 years ago and is JESSIE with no other malignancy in the family -nobody has a high hemoglobin in the family     Review of Systems   Constitutional: Negative for activity change, appetite change, chills, fatigue and fever.   HENT: Negative for mouth sores, nosebleeds and trouble swallowing.    Respiratory: Negative for cough and shortness of breath.    Cardiovascular: Negative for chest pain and leg swelling.   Gastrointestinal: Negative for abdominal pain, constipation, diarrhea, nausea and vomiting.   Genitourinary: Negative for difficulty urinating.   Skin: Negative for rash.   Neurological: Negative for dizziness, weakness and numbness.   Hematological: Negative for  "adenopathy. Does not bruise/bleed easily.   Psychiatric/Behavioral: Negative for sleep disturbance.        Objective     Vitals:    04/12/19 1202   BP: 128/68   Pulse: 56   Resp: 16   Temp: 98.1 °F (36.7 °C)   SpO2: 93%   Weight: 117 kg (258 lb 9.6 oz)   Height: 180 cm (70.87\")   PainSc: 0-No pain     Current Status 4/12/2019   ECOG score 0       Physical Exam   Constitutional: He is oriented to person, place, and time. He appears well-developed and well-nourished. No distress.   HENT:   Head: Normocephalic and atraumatic.   Eyes: Pupils are equal, round, and reactive to light.   Neck: Normal range of motion.   Cardiovascular: Normal rate, regular rhythm and normal heart sounds.   No murmur heard.  Pulmonary/Chest: Effort normal and breath sounds normal. No respiratory distress. He has no wheezes. He has no rhonchi. He has no rales.   Musculoskeletal: Normal range of motion. He exhibits no edema.   Neurological: He is alert and oriented to person, place, and time.   Skin: Skin is warm and dry. No rash noted.   Psychiatric: He has a normal mood and affect.   Vitals reviewed.        RECENT LABS:  Hematology WBC   Date Value Ref Range Status   04/12/2019 8.94 3.40 - 10.80 10*3/mm3 Final     RBC   Date Value Ref Range Status   04/12/2019 5.64 4.14 - 5.80 10*6/mm3 Final     Hemoglobin   Date Value Ref Range Status   04/12/2019 15.6 13.0 - 17.7 g/dL Final   01/08/2018 14.7 13.5 - 17.5 g/dL Final     Hematocrit   Date Value Ref Range Status   04/12/2019 47.4 37.5 - 51.0 % Final   01/08/2018 43.8 41.0 - 53.0 % Final     Platelets   Date Value Ref Range Status   04/12/2019 250 140 - 450 10*3/mm3 Final        Peripheral blood smear shows normochromic normocytic red cells without polychromasia white cells show no increase in immature forms.  Occasional Dohle bodies are noted there is no eosinophilia or basophilia.  Platelets are increased but normal morphologically.    Assessment/Plan      1.  Isolated Erythrocytosis without " leukocytosis or thrombocytosis- --the long duration of his symptoms suggest possibly a high affinity hemoglobin which is backed up by the left shifted P 50  Getting phlebotomies every 2 months and is tolerating it well but unfortunately had a heart attack in September with a hematocrit of 40% and therefore we will resume phlebotomy trying to keep him below 46%    2.  Headaches and dizziness possibly related to erythrocytosis-improved after phlebotomy.  3.  Cough etiology unclear -possibly from lisinopril-Negative chest x-ray        PLAN:  1.  Hematocrit 47.4 today, proceed with phlebotomy.  2.  Return in 2 months for CBC with RN review and possible phlebotomy.  3.  Return in 4 months with repeat labs follow-up with Dr. Andino, and possible phlebotomy.

## 2019-06-12 ENCOUNTER — LAB (OUTPATIENT)
Dept: LAB | Facility: HOSPITAL | Age: 74
End: 2019-06-12

## 2019-06-12 ENCOUNTER — INFUSION (OUTPATIENT)
Dept: ONCOLOGY | Facility: HOSPITAL | Age: 74
End: 2019-06-12

## 2019-06-12 VITALS
TEMPERATURE: 98.3 F | DIASTOLIC BLOOD PRESSURE: 69 MMHG | BODY MASS INDEX: 36.12 KG/M2 | SYSTOLIC BLOOD PRESSURE: 147 MMHG | HEART RATE: 77 BPM | WEIGHT: 258 LBS

## 2019-06-12 DIAGNOSIS — D75.1 ERYTHROCYTOSIS: ICD-10-CM

## 2019-06-12 LAB
BASOPHILS # BLD AUTO: 0.09 10*3/MM3 (ref 0–0.2)
BASOPHILS NFR BLD AUTO: 1.1 % (ref 0–1.5)
DEPRECATED RDW RBC AUTO: 45.3 FL (ref 37–54)
EOSINOPHIL # BLD AUTO: 0.22 10*3/MM3 (ref 0–0.4)
EOSINOPHIL NFR BLD AUTO: 2.6 % (ref 0.3–6.2)
ERYTHROCYTE [DISTWIDTH] IN BLOOD BY AUTOMATED COUNT: 15 % (ref 12.3–15.4)
HCT VFR BLD AUTO: 43.5 % (ref 37.5–51)
HGB BLD-MCNC: 14.4 G/DL (ref 13–17.7)
IMM GRANULOCYTES # BLD AUTO: 0.05 10*3/MM3 (ref 0–0.05)
IMM GRANULOCYTES NFR BLD AUTO: 0.6 % (ref 0–0.5)
LYMPHOCYTES # BLD AUTO: 1.83 10*3/MM3 (ref 0.7–3.1)
LYMPHOCYTES NFR BLD AUTO: 21.6 % (ref 19.6–45.3)
MCH RBC QN AUTO: 27.3 PG (ref 26.6–33)
MCHC RBC AUTO-ENTMCNC: 33.1 G/DL (ref 31.5–35.7)
MCV RBC AUTO: 82.5 FL (ref 79–97)
MONOCYTES # BLD AUTO: 1.01 10*3/MM3 (ref 0.1–0.9)
MONOCYTES NFR BLD AUTO: 11.9 % (ref 5–12)
NEUTROPHILS # BLD AUTO: 5.26 10*3/MM3 (ref 1.7–7)
NEUTROPHILS NFR BLD AUTO: 62.2 % (ref 42.7–76)
NRBC BLD AUTO-RTO: 0 /100 WBC (ref 0–0.2)
PLATELET # BLD AUTO: 259 10*3/MM3 (ref 140–450)
PMV BLD AUTO: 9.4 FL (ref 6–12)
RBC # BLD AUTO: 5.27 10*6/MM3 (ref 4.14–5.8)
WBC NRBC COR # BLD: 8.46 10*3/MM3 (ref 3.4–10.8)

## 2019-06-12 PROCEDURE — 85025 COMPLETE CBC W/AUTO DIFF WBC: CPT | Performed by: INTERNAL MEDICINE

## 2019-06-12 PROCEDURE — 36416 COLLJ CAPILLARY BLOOD SPEC: CPT | Performed by: INTERNAL MEDICINE

## 2019-08-20 ENCOUNTER — INFUSION (OUTPATIENT)
Dept: ONCOLOGY | Facility: HOSPITAL | Age: 74
End: 2019-08-20

## 2019-08-20 ENCOUNTER — LAB (OUTPATIENT)
Dept: LAB | Facility: HOSPITAL | Age: 74
End: 2019-08-20

## 2019-08-20 ENCOUNTER — OFFICE VISIT (OUTPATIENT)
Dept: ONCOLOGY | Facility: CLINIC | Age: 74
End: 2019-08-20

## 2019-08-20 VITALS
HEART RATE: 55 BPM | WEIGHT: 242.3 LBS | SYSTOLIC BLOOD PRESSURE: 136 MMHG | DIASTOLIC BLOOD PRESSURE: 78 MMHG | RESPIRATION RATE: 16 BRPM | OXYGEN SATURATION: 94 % | HEIGHT: 71 IN | TEMPERATURE: 98.1 F | BODY MASS INDEX: 33.92 KG/M2

## 2019-08-20 DIAGNOSIS — D75.1 ERYTHROCYTOSIS: Primary | ICD-10-CM

## 2019-08-20 DIAGNOSIS — D75.1 ERYTHROCYTOSIS: ICD-10-CM

## 2019-08-20 LAB
ALBUMIN SERPL-MCNC: 4.3 G/DL (ref 3.5–5.2)
ALBUMIN/GLOB SERPL: 1.5 G/DL (ref 1.1–2.4)
ALP SERPL-CCNC: 65 U/L (ref 38–116)
ALT SERPL W P-5'-P-CCNC: 12 U/L (ref 0–41)
ANION GAP SERPL CALCULATED.3IONS-SCNC: 12.1 MMOL/L (ref 5–15)
AST SERPL-CCNC: 16 U/L (ref 0–40)
BASOPHILS # BLD AUTO: 0.09 10*3/MM3 (ref 0–0.2)
BASOPHILS NFR BLD AUTO: 1 % (ref 0–1.5)
BILIRUB SERPL-MCNC: 0.7 MG/DL (ref 0.2–1.2)
BUN BLD-MCNC: 22 MG/DL (ref 6–20)
BUN/CREAT SERPL: 19.3 (ref 7.3–30)
CALCIUM SPEC-SCNC: 9.6 MG/DL (ref 8.5–10.2)
CHLORIDE SERPL-SCNC: 105 MMOL/L (ref 98–107)
CO2 SERPL-SCNC: 24.9 MMOL/L (ref 22–29)
CREAT BLD-MCNC: 1.14 MG/DL (ref 0.7–1.3)
DEPRECATED RDW RBC AUTO: 50.6 FL (ref 37–54)
EOSINOPHIL # BLD AUTO: 0.14 10*3/MM3 (ref 0–0.4)
EOSINOPHIL NFR BLD AUTO: 1.6 % (ref 0.3–6.2)
ERYTHROCYTE [DISTWIDTH] IN BLOOD BY AUTOMATED COUNT: 16.8 % (ref 12.3–15.4)
GFR SERPL CREATININE-BSD FRML MDRD: 63 ML/MIN/1.73
GLOBULIN UR ELPH-MCNC: 2.8 GM/DL (ref 1.8–3.5)
GLUCOSE BLD-MCNC: 94 MG/DL (ref 74–124)
HCT VFR BLD AUTO: 50.3 % (ref 37.5–51)
HGB BLD-MCNC: 15.7 G/DL (ref 13–17.7)
IMM GRANULOCYTES # BLD AUTO: 0.05 10*3/MM3 (ref 0–0.05)
IMM GRANULOCYTES NFR BLD AUTO: 0.6 % (ref 0–0.5)
LYMPHOCYTES # BLD AUTO: 1.63 10*3/MM3 (ref 0.7–3.1)
LYMPHOCYTES NFR BLD AUTO: 18.8 % (ref 19.6–45.3)
MCH RBC QN AUTO: 27 PG (ref 26.6–33)
MCHC RBC AUTO-ENTMCNC: 31.2 G/DL (ref 31.5–35.7)
MCV RBC AUTO: 86.6 FL (ref 79–97)
MONOCYTES # BLD AUTO: 1.01 10*3/MM3 (ref 0.1–0.9)
MONOCYTES NFR BLD AUTO: 11.6 % (ref 5–12)
NEUTROPHILS # BLD AUTO: 5.75 10*3/MM3 (ref 1.7–7)
NEUTROPHILS NFR BLD AUTO: 66.4 % (ref 42.7–76)
NRBC BLD AUTO-RTO: 0 /100 WBC (ref 0–0.2)
PLATELET # BLD AUTO: 241 10*3/MM3 (ref 140–450)
PMV BLD AUTO: 9.3 FL (ref 6–12)
POTASSIUM BLD-SCNC: 4.7 MMOL/L (ref 3.5–4.7)
PROT SERPL-MCNC: 7.1 G/DL (ref 6.3–8)
RBC # BLD AUTO: 5.81 10*6/MM3 (ref 4.14–5.8)
SODIUM BLD-SCNC: 142 MMOL/L (ref 134–145)
WBC NRBC COR # BLD: 8.67 10*3/MM3 (ref 3.4–10.8)

## 2019-08-20 PROCEDURE — 80053 COMPREHEN METABOLIC PANEL: CPT

## 2019-08-20 PROCEDURE — 36415 COLL VENOUS BLD VENIPUNCTURE: CPT

## 2019-08-20 PROCEDURE — 99214 OFFICE O/P EST MOD 30 MIN: CPT | Performed by: INTERNAL MEDICINE

## 2019-08-20 PROCEDURE — 85025 COMPLETE CBC W/AUTO DIFF WBC: CPT

## 2019-08-20 PROCEDURE — 99195 PHLEBOTOMY: CPT | Performed by: INTERNAL MEDICINE

## 2019-08-20 RX ORDER — SODIUM CHLORIDE 9 MG/ML
250 INJECTION, SOLUTION INTRAVENOUS ONCE
Status: CANCELLED | OUTPATIENT
Start: 2019-08-20

## 2019-08-20 RX ORDER — TRIAMCINOLONE ACETONIDE 55 UG/1
2 SPRAY, METERED NASAL DAILY
COMMUNITY

## 2019-08-20 NOTE — PROGRESS NOTES
Subjective     REASON FOR FOLLOWUP:   1 Erythrocytosis left shifted oxygen dissociation curve?  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  Bilateral kidney cysts  3.  Persistent elevated carboxyhemoglobin with no obvious cause                             REQUESTING PHYSICIAN: Alessandra ANDUJAR    History of Present Illness patient is a 73-year-old male with the above-mentioned history here today for follow-up.  He reports that he has been feeling well.  He continues to do cardiac rehabilitation 3 days a week.  He denies any headaches or dizzy spells.  He continues on an 81 mg aspirin daily.    He has required phlebotomy roughly 4 times in the last year and we will move him to an every 3-week schedule  He has no new complaints at this time but has had 2 new skin cancers taken off his face and is anticipating Mohs surgery    Continues with multiple kidney stones and is seeing the urologist for this    Past Medical History:   Diagnosis Date   • CAD (coronary artery disease)    • H/O Blood dyscrasia    • History of snoring    • Hyperlipidemia    • Hypertension    • Kidney stone    • Low hemoglobin    • Myocardial infarction (CMS/HCC) 09/22/2018    placed one stent   • Seasonal allergies    • Skin cancer     Left forearm    skin cancers for which he takes light treatments periodically    Past Surgical History:   Procedure Laterality Date   • CARDIAC CATHETERIZATION  2018   • CATARACT EXTRACTION     • COLONOSCOPY      x4   • CORONARY STENT PLACEMENT  2018   • ENDOSCOPY  1972   • HERNIA REPAIR  1995   • KIDNEY STONE SURGERY  2006   • SKIN CANCER EXCISION      Cheek-squamous cell and top of head-basal cell carcioma   • URETEROSCOPY Right    • VASECTOMY  1982        HEME HISTORY:  patient is a 70-year-old male fairly good health except for hypertension and hypercholesterolemia and kidney stones was noted on recent blood work to have an elevated hemoglobin and hematocrit of 17/50% on 2 or 3 occasions.  Patient was referred to  us for evaluation of this.  He has had some recent headaches and lightheadedness which she attributes to his blood pressure medicines but otherwise feels well.  His note night sweats weight loss or loss of appetite or pain anywhere.  He has been on the same blood pressure medicines for many years.  On questioning him further he reports that when he was in his early 20s he works as a  and his hemoglobin was high in the 16 g range even then and he thinks this may be a long term problem that he has had.  His 2 children and 3 sisters but does not know of anybody else has an elevated hemoglobin.  He is not a smoker does not have sleep apnea and has a carbon monoxide monitoring in his home.  He has had no recent CAT scans of the abdomen no hematuria or kidney disease.  He is not taking any testosterone injections or preparations.    71-year-old male with long-standing erythrocytosis returns today to review the results of his blood work and imaging.  We did a chest x-ray because he complained of a chronic cough and this is thankfully benign except for some mild hyperinflation/COPD which is unusual as he is a nonsmoker.  Suspect the lisinopril is causing his cough.  Ultrasound of kidneys showed bilateral large kidney cysts but no masses.  His erythropoietin level was low at 5 and his, carbon monoxide level was mildly elevated 2.4 and his P 50 was left shifted suggesting a possible-high affinity hemoglobin!  His tolerating phlebotomy well and he actually felt better after the phlebotomy and therefore we will continue for the time being although I did tell him that I would repeat his carbon monoxide level to make sure it is back down as he got a new furnace for his home and this may cause some changes.  And he could get his phlebotomies at the [facility] because he has no evidence of polycythemia vera/ bone marrow disorder at this point.        Current Outpatient Medications:   •  aspirin 81 MG disintegrating tablet,  Take 81 mg by mouth., Disp: , Rfl:   •  atorvastatin (LIPITOR) 40 MG tablet, 40 mg., Disp: , Rfl:   •  clopidogrel (PLAVIX) 75 MG tablet, , Disp: , Rfl:   •  lisinopril (PRINIVIL,ZESTRIL) 10 MG tablet, , Disp: , Rfl:   •  metoprolol succinate XL (TOPROL-XL) 12.5 MG 24 hr half tablet, Take 25 mg by mouth Daily., Disp: , Rfl:   •  nitroglycerin (NITROSTAT) 0.4 MG SL tablet, Place 0.4 mg under the tongue Every 5 (Five) Minutes As Needed for Chest Pain. Take no more than 3 doses in 15 minutes., Disp: , Rfl:   •  omega-3 acid ethyl esters (LOVAZA) 1 G capsule, , Disp: , Rfl:   •  polyethylene glycol (MIRALAX) packet, Take 17 g by mouth Daily., Disp: , Rfl:   •  Triamcinolone Acetonide (NASACORT ALLERGY 24HR) 55 MCG/ACT nasal inhaler, 2 sprays into the nostril(s) as directed by provider Daily., Disp: , Rfl:   •  hydrocortisone 2.5 % cream, , Disp: , Rfl:   •  Psyllium (METAMUCIL MULTIHEALTH FIBER PO), Metamucil [employer] Fiber, Disp: , Rfl:     ALLERGIES:    Allergies   Allergen Reactions   • Erythromycin    • Sulfa Antibiotics    • Amoxicillin-Pot Clavulanate GI Intolerance     Stomach cramping--amoxicillin is OK          Social History     Socioeconomic History   • Marital status:      Spouse name: Radha   • Number of children: Not on file   • Years of education: High school   • Highest education level: Not on file   Occupational History   • Occupation: Electronic maintenance`     Employer: RETIRED     Comment: [employer]   Tobacco Use   • Smoking status: Never Smoker   • Smokeless tobacco: Never Used   Substance and Sexual Activity   • Alcohol use: Yes     Comment: Occasionally   • Drug use: No   • Sexual activity: Defer     does not smoke or drink no risk factors for HIV worked in [employer] has no chemicals or toxic exposure     Family History   Problem Relation Age of Onset   • Heart disease Mother    • Hypertension Mother    • Cancer Son 43        Squamous cell CA in tonsils        "30-year-old son had squamous cell carcinoma of the tonsil 7 years ago and is JESSIE with no other malignancy in the family -nobody has a high hemoglobin in the family     Review of Systems   Constitutional: Negative for activity change, appetite change, chills, fatigue and fever.   HENT: Negative for mouth sores, nosebleeds and trouble swallowing.    Respiratory: Negative for cough and shortness of breath.    Cardiovascular: Negative for chest pain and leg swelling.   Gastrointestinal: Negative for abdominal pain, constipation, diarrhea, nausea and vomiting.   Genitourinary: Negative for difficulty urinating.   Skin: Negative for rash.   Neurological: Negative for dizziness, weakness and numbness.   Hematological: Negative for adenopathy. Does not bruise/bleed easily.   Psychiatric/Behavioral: Negative for sleep disturbance.        Objective     Vitals:    08/20/19 1149   BP: 136/78   Pulse: 55   Resp: 16   Temp: 98.1 °F (36.7 °C)   SpO2: 94%   Weight: 110 kg (242 lb 4.8 oz)   Height: 180 cm (70.87\")   PainSc: 0-No pain     Current Status 8/20/2019   ECOG score 0         GENERAL:  Well-developed, well-nourished in no acute distress.   SKIN:  Warm, dry without rashes, purpura or petechiae.  Bandages on his nose and cheek from skin biopsies  EYES:  Pupils equal, round and reactive to light.  EOMs intact.  Conjunctivae normal.  EARS:  Hearing intact.  NOSE:  Septum midline.  No excoriations or nasal discharge.  MOUTH:  Tongue is well-papillated; no stomatitis or ulcers.  Lips normal.  THROAT:  Oropharynx without lesions or exudates.  NECK:  Supple with good range of motion; no thyromegaly or masses, no JVD.  LYMPHATICS:  No cervical, supraclavicular, axillary or inguinal adenopathy.  CHEST:  Lungs clear to auscultation. Good airflow.  CARDIAC:  Regular rate and rhythm without murmurs, rubs or gallops. Normal S1,S2.  ABDOMEN:  Soft, nontender with no hepatosplenomegaly or masses.  EXTREMITIES:  No clubbing, cyanosis or " edema.  NEUROLOGICAL:  Cranial Nerves II-XII grossly intact.  No focal neurological deficits.  PSYCHIATRIC:  Normal affect and mood.          RECENT LABS:  Hematology WBC   Date Value Ref Range Status   08/20/2019 8.67 3.40 - 10.80 10*3/mm3 Final   10/29/2018 9.97 4.5 - 11.0 10*3/uL Final     RBC   Date Value Ref Range Status   08/20/2019 5.81 (H) 4.14 - 5.80 10*6/mm3 Final   10/29/2018 5.36 4.5 - 5.9 10*6/uL Final     Hemoglobin   Date Value Ref Range Status   08/20/2019 15.7 13.0 - 17.7 g/dL Final   10/29/2018 15.4 13.5 - 17.5 g/dL Final     Hematocrit   Date Value Ref Range Status   08/20/2019 50.3 37.5 - 51.0 % Final   10/29/2018 45.9 41.0 - 53.0 % Final     Platelets   Date Value Ref Range Status   08/20/2019 241 140 - 450 10*3/mm3 Final   10/29/2018 240 140 - 440 10*3/uL Final   09/27/2018 262 140 - 440 10*3/uL Final        Peripheral blood smear shows normochromic normocytic red cells without polychromasia white cells show no increase in immature forms.  Occasional Dohle bodies are noted there is no eosinophilia or basophilia.  Platelets are increased but normal morphologically.          Assessment/Plan      1.  Isolated Erythrocytosis without leukocytosis or thrombocytosis- --the long duration of his symptoms suggest possibly a high affinity hemoglobin which is backed up by the left shifted P 50  Getting phlebotomies every 2 months and is tolerating it well but unfortunately had a heart attack in September with a hematocrit of 45% and therefore we will resume phlebotomy trying to keep him below 46%    2.  Headaches and dizziness possibly related to erythrocytosis-improved after phlebotomy.  3.  Cough  Resolved    4.  Multiple kidney stones and multiple kidney cysts followed by urology        PLAN:  1.  Tegdlwtxdg45% today, proceed with phlebotomy.  2.  Return in 3/6/9 months for CBC with RN review and possible phlebotomy.  3.  Return in 6 months for NP follow-up in 12 months follow-up with Dr. Andino, and  possible phlebotomy.

## 2019-11-19 ENCOUNTER — LAB (OUTPATIENT)
Dept: LAB | Facility: HOSPITAL | Age: 74
End: 2019-11-19

## 2019-11-19 ENCOUNTER — INFUSION (OUTPATIENT)
Dept: ONCOLOGY | Facility: HOSPITAL | Age: 74
End: 2019-11-19

## 2019-11-19 VITALS
DIASTOLIC BLOOD PRESSURE: 75 MMHG | HEART RATE: 63 BPM | SYSTOLIC BLOOD PRESSURE: 128 MMHG | BODY MASS INDEX: 35.7 KG/M2 | RESPIRATION RATE: 16 BRPM | OXYGEN SATURATION: 96 % | WEIGHT: 255 LBS | TEMPERATURE: 98.4 F

## 2019-11-19 DIAGNOSIS — D75.1 ERYTHROCYTOSIS: ICD-10-CM

## 2019-11-19 DIAGNOSIS — D75.1 ERYTHROCYTOSIS: Primary | ICD-10-CM

## 2019-11-19 LAB
BASOPHILS # BLD AUTO: 0.11 10*3/MM3 (ref 0–0.2)
BASOPHILS NFR BLD AUTO: 1.2 % (ref 0–1.5)
DEPRECATED RDW RBC AUTO: 44.9 FL (ref 37–54)
EOSINOPHIL # BLD AUTO: 0.27 10*3/MM3 (ref 0–0.4)
EOSINOPHIL NFR BLD AUTO: 2.9 % (ref 0.3–6.2)
ERYTHROCYTE [DISTWIDTH] IN BLOOD BY AUTOMATED COUNT: 15 % (ref 12.3–15.4)
HCT VFR BLD AUTO: 50.5 % (ref 37.5–51)
HGB BLD-MCNC: 17 G/DL (ref 13–17.7)
IMM GRANULOCYTES # BLD AUTO: 0.07 10*3/MM3 (ref 0–0.05)
IMM GRANULOCYTES NFR BLD AUTO: 0.8 % (ref 0–0.5)
LYMPHOCYTES # BLD AUTO: 2.28 10*3/MM3 (ref 0.7–3.1)
LYMPHOCYTES NFR BLD AUTO: 24.5 % (ref 19.6–45.3)
MCH RBC QN AUTO: 28.1 PG (ref 26.6–33)
MCHC RBC AUTO-ENTMCNC: 33.7 G/DL (ref 31.5–35.7)
MCV RBC AUTO: 83.3 FL (ref 79–97)
MONOCYTES # BLD AUTO: 1.03 10*3/MM3 (ref 0.1–0.9)
MONOCYTES NFR BLD AUTO: 11.1 % (ref 5–12)
NEUTROPHILS # BLD AUTO: 5.54 10*3/MM3 (ref 1.7–7)
NEUTROPHILS NFR BLD AUTO: 59.5 % (ref 42.7–76)
NRBC BLD AUTO-RTO: 0 /100 WBC (ref 0–0.2)
PLATELET # BLD AUTO: 295 10*3/MM3 (ref 140–450)
PMV BLD AUTO: 10.1 FL (ref 6–12)
RBC # BLD AUTO: 6.06 10*6/MM3 (ref 4.14–5.8)
WBC NRBC COR # BLD: 9.3 10*3/MM3 (ref 3.4–10.8)

## 2019-11-19 PROCEDURE — 36415 COLL VENOUS BLD VENIPUNCTURE: CPT

## 2019-11-19 PROCEDURE — 85025 COMPLETE CBC W/AUTO DIFF WBC: CPT

## 2019-11-19 PROCEDURE — 99195 PHLEBOTOMY: CPT | Performed by: NURSE PRACTITIONER

## 2019-11-19 RX ORDER — SODIUM CHLORIDE 9 MG/ML
250 INJECTION, SOLUTION INTRAVENOUS ONCE
Status: CANCELLED | OUTPATIENT
Start: 2019-11-19

## 2019-11-20 ENCOUNTER — TELEPHONE (OUTPATIENT)
Dept: ONCOLOGY | Facility: CLINIC | Age: 74
End: 2019-11-20

## 2019-11-20 NOTE — TELEPHONE ENCOUNTER
----- Message from Margarita Andino MD sent at 11/19/2019  3:57 PM EST -----  Regarding: RE: Stopping Plavix  yes  ----- Message -----  From: Tayler Rodriguez MA  Sent: 11/19/2019   1:31 PM  To: Margarita Andino MD  Subject: Stopping Plavix                                  Patient came in and stated that his heart Dr wanted him to stop taking plavix but wanted him to ask you if it would be ok

## 2020-02-11 ENCOUNTER — INFUSION (OUTPATIENT)
Dept: ONCOLOGY | Facility: HOSPITAL | Age: 75
End: 2020-02-11

## 2020-02-11 ENCOUNTER — OFFICE VISIT (OUTPATIENT)
Dept: ONCOLOGY | Facility: CLINIC | Age: 75
End: 2020-02-11

## 2020-02-11 ENCOUNTER — LAB (OUTPATIENT)
Dept: LAB | Facility: HOSPITAL | Age: 75
End: 2020-02-11

## 2020-02-11 VITALS
DIASTOLIC BLOOD PRESSURE: 50 MMHG | TEMPERATURE: 98 F | RESPIRATION RATE: 16 BRPM | HEART RATE: 64 BPM | HEIGHT: 71 IN | OXYGEN SATURATION: 96 % | BODY MASS INDEX: 36.65 KG/M2 | SYSTOLIC BLOOD PRESSURE: 154 MMHG | WEIGHT: 261.8 LBS

## 2020-02-11 DIAGNOSIS — D75.1 ERYTHROCYTOSIS: Primary | ICD-10-CM

## 2020-02-11 DIAGNOSIS — D75.1 ERYTHROCYTOSIS: ICD-10-CM

## 2020-02-11 LAB
BASOPHILS # BLD AUTO: 0.07 10*3/MM3 (ref 0–0.2)
BASOPHILS NFR BLD AUTO: 0.8 % (ref 0–1.5)
DEPRECATED RDW RBC AUTO: 46.4 FL (ref 37–54)
EOSINOPHIL # BLD AUTO: 0.28 10*3/MM3 (ref 0–0.4)
EOSINOPHIL NFR BLD AUTO: 3.4 % (ref 0.3–6.2)
ERYTHROCYTE [DISTWIDTH] IN BLOOD BY AUTOMATED COUNT: 14.8 % (ref 12.3–15.4)
HCT VFR BLD AUTO: 50.1 % (ref 37.5–51)
HGB BLD-MCNC: 16.8 G/DL (ref 13–17.7)
IMM GRANULOCYTES # BLD AUTO: 0.08 10*3/MM3 (ref 0–0.05)
IMM GRANULOCYTES NFR BLD AUTO: 1 % (ref 0–0.5)
LYMPHOCYTES # BLD AUTO: 2.19 10*3/MM3 (ref 0.7–3.1)
LYMPHOCYTES NFR BLD AUTO: 26.4 % (ref 19.6–45.3)
MCH RBC QN AUTO: 28.9 PG (ref 26.6–33)
MCHC RBC AUTO-ENTMCNC: 33.5 G/DL (ref 31.5–35.7)
MCV RBC AUTO: 86.1 FL (ref 79–97)
MONOCYTES # BLD AUTO: 1.27 10*3/MM3 (ref 0.1–0.9)
MONOCYTES NFR BLD AUTO: 15.3 % (ref 5–12)
NEUTROPHILS # BLD AUTO: 4.4 10*3/MM3 (ref 1.7–7)
NEUTROPHILS NFR BLD AUTO: 53.1 % (ref 42.7–76)
NRBC BLD AUTO-RTO: 0 /100 WBC (ref 0–0.2)
PLATELET # BLD AUTO: 195 10*3/MM3 (ref 140–450)
PMV BLD AUTO: 9.6 FL (ref 6–12)
RBC # BLD AUTO: 5.82 10*6/MM3 (ref 4.14–5.8)
WBC NRBC COR # BLD: 8.29 10*3/MM3 (ref 3.4–10.8)

## 2020-02-11 PROCEDURE — 99213 OFFICE O/P EST LOW 20 MIN: CPT | Performed by: NURSE PRACTITIONER

## 2020-02-11 PROCEDURE — 99195 PHLEBOTOMY: CPT

## 2020-02-11 PROCEDURE — 36415 COLL VENOUS BLD VENIPUNCTURE: CPT

## 2020-02-11 PROCEDURE — 85025 COMPLETE CBC W/AUTO DIFF WBC: CPT

## 2020-02-11 RX ORDER — BACLOFEN 10 MG/1
TABLET ORAL EVERY 8 HOURS
COMMUNITY
End: 2021-08-04

## 2020-02-11 RX ORDER — SODIUM CHLORIDE 9 MG/ML
250 INJECTION, SOLUTION INTRAVENOUS ONCE
Status: CANCELLED | OUTPATIENT
Start: 2020-02-11

## 2020-02-11 RX ORDER — PREDNISOLONE ACETATE 10 MG/ML
SUSPENSION/ DROPS OPHTHALMIC
COMMUNITY
End: 2022-11-27

## 2020-02-11 RX ORDER — PREDNISONE 10 MG/1
TABLET ORAL
COMMUNITY
End: 2021-08-04

## 2020-02-11 NOTE — PROGRESS NOTES
Subjective     REASON FOR FOLLOWUP:   1  Erythrocytosis left shifted oxygen dissociation curve?  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  Bilateral kidney cysts  3.  Persistent elevated carboxyhemoglobin with no obvious cause                             REQUESTING PHYSICIAN: Alessandra ANDUJAR    History of Present Illness patient is a 74-year-old male with the above-mentioned history here today for follow-up, accompanied by his wife.  He is doing well with no specific new concerns.    We discussed that he has been coming back every 3 months for phlebotomy and notably his hematocrit each time is been around 50%.  In years past we had been seeing him roughly every 2 months especially after he suffered an MI in September 2018.  Our goal had been to keep his hematocrit closer to 45%.  We discussed that likely it is time to increase the frequency again though I will discuss further with Dr. Andino.    Again he is asymptomatic today and feeling well overall.      Past Medical History:   Diagnosis Date   • CAD (coronary artery disease)    • H/O Blood dyscrasia    • History of snoring    • Hyperlipidemia    • Hypertension    • Kidney stone    • Low hemoglobin    • Myocardial infarction (CMS/HCC) 09/22/2018    placed one stent   • Seasonal allergies    • Skin cancer     Left forearm    skin cancers for which he takes light treatments periodically    Past Surgical History:   Procedure Laterality Date   • CARDIAC CATHETERIZATION  2018   • CATARACT EXTRACTION     • COLONOSCOPY      x4   • CORONARY STENT PLACEMENT  2018   • ENDOSCOPY  1972   • HERNIA REPAIR  1995   • KIDNEY STONE SURGERY  2006   • SKIN CANCER EXCISION      Cheek-squamous cell and top of head-basal cell carcioma   • URETEROSCOPY Right    • VASECTOMY  1982        HEME HISTORY:  patient is a 70-year-old male fairly good health except for hypertension and hypercholesterolemia and kidney stones was noted on recent blood work to have an elevated hemoglobin and  hematocrit of 17/50% on 2 or 3 occasions.  Patient was referred to us for evaluation of this.  He has had some recent headaches and lightheadedness which she attributes to his blood pressure medicines but otherwise feels well.  His note night sweats weight loss or loss of appetite or pain anywhere.  He has been on the same blood pressure medicines for many years.  On questioning him further he reports that when he was in his early 20s he works as a  and his hemoglobin was high in the 16 g range even then and he thinks this may be a long term problem that he has had.  His 2 children and 3 sisters but does not know of anybody else has an elevated hemoglobin.  He is not a smoker does not have sleep apnea and has a carbon monoxide monitoring in his home.  He has had no recent CAT scans of the abdomen no hematuria or kidney disease.  He is not taking any testosterone injections or preparations.    71-year-old male with long-standing erythrocytosis returns today to review the results of his blood work and imaging.  We did a chest x-ray because he complained of a chronic cough and this is thankfully benign except for some mild hyperinflation/COPD which is unusual as he is a nonsmoker.  Suspect the lisinopril is causing his cough.  Ultrasound of kidneys showed bilateral large kidney cysts but no masses.  His erythropoietin level was low at 5 and his, carbon monoxide level was mildly elevated 2.4 and his P 50 was left shifted suggesting a possible-high affinity hemoglobin!  His tolerating phlebotomy well and he actually felt better after the phlebotomy and therefore we will continue for the time being although I did tell him that I would repeat his carbon monoxide level to make sure it is back down as he got a new furnace for his home and this may cause some changes.  And he could get his phlebotomies at the [facility] because he has no evidence of polycythemia vera/ bone marrow disorder at this point.        Current  Outpatient Medications:   •  aspirin 81 MG disintegrating tablet, Take 81 mg by mouth., Disp: , Rfl:   •  atorvastatin (LIPITOR) 40 MG tablet, 40 mg., Disp: , Rfl:   •  baclofen (LIORESAL) 10 MG tablet, Every 8 (Eight) Hours., Disp: , Rfl:   •  hydrocortisone 2.5 % cream, , Disp: , Rfl:   •  lisinopril (PRINIVIL,ZESTRIL) 10 MG tablet, , Disp: , Rfl:   •  metoprolol succinate XL (TOPROL-XL) 12.5 MG 24 hr half tablet, Take 25 mg by mouth Daily., Disp: , Rfl:   •  nitroglycerin (NITROSTAT) 0.4 MG SL tablet, Place 0.4 mg under the tongue Every 5 (Five) Minutes As Needed for Chest Pain. Take no more than 3 doses in 15 minutes., Disp: , Rfl:   •  omega-3 acid ethyl esters (LOVAZA) 1 G capsule, , Disp: , Rfl:   •  polyethylene glycol (MIRALAX) packet, Take 17 g by mouth Daily., Disp: , Rfl:   •  prednisoLONE acetate (PRED FORTE) 1 % ophthalmic suspension, prednisolone acetate 1 % eye drops,suspension, Disp: , Rfl:   •  predniSONE (DELTASONE) 10 MG tablet, prednisone 10 mg tablet  4 tab x 3 days, 3 tab x 3 days, 2 tab x 3 days, 1 tab x 3 day, Disp: , Rfl:   •  Psyllium (METAMUCIL MULTIHEALTH FIBER PO), Metamucil [facility] Fiber, Disp: , Rfl:   •  Triamcinolone Acetonide (NASACORT ALLERGY 24HR) 55 MCG/ACT nasal inhaler, 2 sprays into the nostril(s) as directed by provider Daily., Disp: , Rfl:   •  clopidogrel (PLAVIX) 75 MG tablet, , Disp: , Rfl:     ALLERGIES:    Allergies   Allergen Reactions   • Erythromycin Unknown - Low Severity   • Sulfa Antibiotics Unknown - Low Severity   • Amoxicillin-Pot Clavulanate GI Intolerance     Stomach cramping--amoxicillin is OK          Social History     Socioeconomic History   • Marital status:      Spouse name: Radha   • Number of children: Not on file   • Years of education: High school   • Highest education level: Not on file   Occupational History   • Occupation: Electronic maintenance`     Employer: RETIRED     Comment: [email or web address]   Tobacco Use   • Smoking status: Never  "Smoker   • Smokeless tobacco: Never Used   Substance and Sexual Activity   • Alcohol use: Yes     Comment: Occasionally   • Drug use: No   • Sexual activity: Defer     does not smoke or drink no risk factors for HIV worked in [employer] has no chemicals or toxic exposure     Family History   Problem Relation Age of Onset   • Heart disease Mother    • Hypertension Mother    • Cancer Son 43        Squamous cell CA in tonsils       30-year-old son had squamous cell carcinoma of the tonsil 7 years ago and is JESSIE with no other malignancy in the family -nobody has a high hemoglobin in the family     Review of Systems   Constitutional: Negative for activity change, appetite change, chills, fatigue and fever.   HENT: Negative for mouth sores, nosebleeds and trouble swallowing.    Respiratory: Negative for cough and shortness of breath.    Cardiovascular: Negative for chest pain and leg swelling.   Gastrointestinal: Negative for abdominal pain, constipation, diarrhea, nausea and vomiting.   Genitourinary: Negative for difficulty urinating.   Skin: Negative for rash.   Neurological: Negative for dizziness, weakness and numbness.   Hematological: Negative for adenopathy. Does not bruise/bleed easily.   Psychiatric/Behavioral: Negative for sleep disturbance.        Objective     Vitals:    02/11/20 1422   BP: 154/50   Pulse: 64   Resp: 16   Temp: 98 °F (36.7 °C)   TempSrc: Oral   SpO2: 96%   Weight: 119 kg (261 lb 12.8 oz)   Height: 180 cm (70.87\")   PainSc: 0-No pain     Current Status 2/11/2020   ECOG score 1     PHYSICAL EXAM:  GENERAL:  Well-developed, well-nourished in no acute distress.   SKIN:  Warm, dry without rashes, purpura or petechiae.   EYES:  Pupils equal, round and reactive to light.  EOMs intact.  Conjunctivae normal.  EARS:  Hearing intact.  NOSE:  Septum midline.  No excoriations or nasal discharge.  MOUTH:  Tongue is well-papillated; no stomatitis or ulcers.  Lips normal.  THROAT:  " Oropharynx without lesions or exudates.  NECK:  Supple with good range of motion; no thyromegaly or masses, no JVD.  LYMPHATICS:  No cervical, supraclavicular, axillary or inguinal adenopathy.  CHEST:  Lungs clear to auscultation. Good airflow.  CARDIAC:  Regular rate and rhythm without murmurs, rubs or gallops. Normal S1,S2.  ABDOMEN:  Soft, nontender with no hepatosplenomegaly or masses.  EXTREMITIES:  No clubbing, cyanosis or edema.  NEUROLOGICAL:  Cranial Nerves II-XII grossly intact.  No focal neurological deficits.  PSYCHIATRIC:  Normal affect and mood.          RECENT LABS:  Results from last 7 days   Lab Units 02/11/20  1414   WBC 10*3/mm3 8.29   NEUTROS ABS 10*3/mm3 4.40   HEMOGLOBIN g/dL 16.8   HEMATOCRIT % 50.1   PLATELETS 10*3/mm3 195                     Assessment/Plan     1.  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    Previously receiving phlebotomies every 2 months and was tolerating well. Had a heart attack in 9/2018 with a hematocrit of 45%.     At his last visit we moved him out to every 3 months again as he was doing well.    Today we discussed that his hematocrit the last 3 visits has been at 50% each time.  I will discuss with Dr. Andino but likely we need to consider moving up his phlebotomies to be every 2 months again to keep his numbers at a little bit safer level.  He verbalized understanding and will wait to hear back from us.  For today he will proceed with phlebotomy as scheduled.    2.  Headaches and dizziness possibly related to erythrocytosis-improved after phlebotomy.  Did not complain of this today.    3.  Multiple kidney stones and multiple kidney cysts followed by urology    PLAN:  1.  Hematocrit 50% today, proceed with phlebotomy.  2.  Patient currently scheduled to return in 3 months for CBC with RN review and possible phlebotomy.   I will discuss with Dr. Andino adjusting this potentially to every 2 months based on recent numbers as outlined above.  3.  Otherwise patient is scheduled to follow-up with Dr. Andino in 6 months for annual review.  4.  He was encouraged to call with any questions or concerns prior to scheduled return.

## 2020-02-19 ENCOUNTER — TELEPHONE (OUTPATIENT)
Dept: ONCOLOGY | Facility: CLINIC | Age: 75
End: 2020-02-19

## 2020-02-19 NOTE — TELEPHONE ENCOUNTER
----- Message from LEONEL Rios sent at 2/19/2020  8:18 AM EST -----  Regarding: change appts  Please remove May appt and make patient a 2 and 4 mo f/u in April and June for lab and possible Phlebo. Keep August f/u with Dr. Andino. Notify patient that this was in f/u to our discussion last week and Dr. Andino agreeing we should move to every 2 month phlebo rather than 3 mo. Thank you!

## 2020-02-19 NOTE — TELEPHONE ENCOUNTER
PT HAS BEEN SCHEDULED AND CALLED WITH THE UPDATED APPTS, LVM IF THE PT HAD ANY QUESTIONS AND ALSO MAILED APPT CARD OUT.

## 2020-03-11 ENCOUNTER — TRANSCRIBE ORDERS (OUTPATIENT)
Dept: ADMINISTRATIVE | Facility: HOSPITAL | Age: 75
End: 2020-03-11

## 2020-03-11 ENCOUNTER — LAB (OUTPATIENT)
Dept: LAB | Facility: HOSPITAL | Age: 75
End: 2020-03-11

## 2020-03-11 ENCOUNTER — HOSPITAL ENCOUNTER (OUTPATIENT)
Dept: CARDIOLOGY | Facility: HOSPITAL | Age: 75
Discharge: HOME | End: 2020-03-11
Admitting: UROLOGY

## 2020-03-11 ENCOUNTER — HOSPITAL ENCOUNTER (OUTPATIENT)
Dept: GENERAL RADIOLOGY | Facility: HOSPITAL | Age: 75
Discharge: HOME | End: 2020-03-11

## 2020-03-11 DIAGNOSIS — N20.0 RENAL STONE: ICD-10-CM

## 2020-03-11 DIAGNOSIS — N20.0 RENAL STONE: Primary | ICD-10-CM

## 2020-03-11 DIAGNOSIS — Z01.811 PRE-OP CHEST EXAM: ICD-10-CM

## 2020-03-11 LAB
ANION GAP SERPL CALCULATED.3IONS-SCNC: 12.7 MMOL/L (ref 5–15)
BASOPHILS # BLD AUTO: 0.12 10*3/MM3 (ref 0–0.2)
BASOPHILS NFR BLD AUTO: 1.1 % (ref 0–1.5)
BUN BLD-MCNC: 20 MG/DL (ref 8–23)
BUN/CREAT SERPL: 19.8 (ref 7–25)
CALCIUM SPEC-SCNC: 9.5 MG/DL (ref 8.6–10.5)
CHLORIDE SERPL-SCNC: 104 MMOL/L (ref 98–107)
CO2 SERPL-SCNC: 24.3 MMOL/L (ref 22–29)
CREAT BLD-MCNC: 1.01 MG/DL (ref 0.76–1.27)
DEPRECATED RDW RBC AUTO: 44.5 FL (ref 37–54)
EOSINOPHIL # BLD AUTO: 0.22 10*3/MM3 (ref 0–0.4)
EOSINOPHIL NFR BLD AUTO: 2 % (ref 0.3–6.2)
ERYTHROCYTE [DISTWIDTH] IN BLOOD BY AUTOMATED COUNT: 14.9 % (ref 12.3–15.4)
GFR SERPL CREATININE-BSD FRML MDRD: 72 ML/MIN/1.73
GLUCOSE BLD-MCNC: 80 MG/DL (ref 65–99)
HCT VFR BLD AUTO: 46.7 % (ref 37.5–51)
HGB BLD-MCNC: 16.1 G/DL (ref 13–17.7)
IMM GRANULOCYTES # BLD AUTO: 0.22 10*3/MM3 (ref 0–0.05)
IMM GRANULOCYTES NFR BLD AUTO: 2 % (ref 0–0.5)
LYMPHOCYTES # BLD AUTO: 1.98 10*3/MM3 (ref 0.7–3.1)
LYMPHOCYTES NFR BLD AUTO: 17.8 % (ref 19.6–45.3)
MCH RBC QN AUTO: 28.9 PG (ref 26.6–33)
MCHC RBC AUTO-ENTMCNC: 34.5 G/DL (ref 31.5–35.7)
MCV RBC AUTO: 83.8 FL (ref 79–97)
MONOCYTES # BLD AUTO: 1.41 10*3/MM3 (ref 0.1–0.9)
MONOCYTES NFR BLD AUTO: 12.7 % (ref 5–12)
NEUTROPHILS # BLD AUTO: 7.17 10*3/MM3 (ref 1.7–7)
NEUTROPHILS NFR BLD AUTO: 64.4 % (ref 42.7–76)
NRBC BLD AUTO-RTO: 0 /100 WBC (ref 0–0.2)
PLATELET # BLD AUTO: 259 10*3/MM3 (ref 140–450)
PMV BLD AUTO: 9.8 FL (ref 6–12)
POTASSIUM BLD-SCNC: 4.9 MMOL/L (ref 3.5–5.2)
RBC # BLD AUTO: 5.57 10*6/MM3 (ref 4.14–5.8)
SODIUM BLD-SCNC: 141 MMOL/L (ref 136–145)
WBC NRBC COR # BLD: 11.12 10*3/MM3 (ref 3.4–10.8)

## 2020-03-11 PROCEDURE — 93005 ELECTROCARDIOGRAM TRACING: CPT | Performed by: UROLOGY

## 2020-03-11 PROCEDURE — 71046 X-RAY EXAM CHEST 2 VIEWS: CPT

## 2020-03-11 PROCEDURE — 36415 COLL VENOUS BLD VENIPUNCTURE: CPT

## 2020-03-11 PROCEDURE — 85025 COMPLETE CBC W/AUTO DIFF WBC: CPT

## 2020-03-11 PROCEDURE — 80048 BASIC METABOLIC PNL TOTAL CA: CPT

## 2020-04-03 PROCEDURE — 93010 ELECTROCARDIOGRAM REPORT: CPT | Performed by: INTERNAL MEDICINE

## 2020-04-07 ENCOUNTER — INFUSION (OUTPATIENT)
Dept: ONCOLOGY | Facility: HOSPITAL | Age: 75
End: 2020-04-07

## 2020-04-07 ENCOUNTER — LAB (OUTPATIENT)
Dept: LAB | Facility: HOSPITAL | Age: 75
End: 2020-04-07

## 2020-04-07 VITALS
SYSTOLIC BLOOD PRESSURE: 163 MMHG | OXYGEN SATURATION: 94 % | TEMPERATURE: 98.1 F | WEIGHT: 261 LBS | HEART RATE: 65 BPM | BODY MASS INDEX: 36.54 KG/M2 | DIASTOLIC BLOOD PRESSURE: 79 MMHG

## 2020-04-07 DIAGNOSIS — D75.1 ERYTHROCYTOSIS: ICD-10-CM

## 2020-04-07 DIAGNOSIS — D75.1 ERYTHROCYTOSIS: Primary | ICD-10-CM

## 2020-04-07 LAB
BASOPHILS # BLD AUTO: 0.1 10*3/MM3 (ref 0–0.2)
BASOPHILS NFR BLD AUTO: 1.1 % (ref 0–1.5)
DEPRECATED RDW RBC AUTO: 43.8 FL (ref 37–54)
EOSINOPHIL # BLD AUTO: 0.2 10*3/MM3 (ref 0–0.4)
EOSINOPHIL NFR BLD AUTO: 2.3 % (ref 0.3–6.2)
ERYTHROCYTE [DISTWIDTH] IN BLOOD BY AUTOMATED COUNT: 14.3 % (ref 12.3–15.4)
HCT VFR BLD AUTO: 50.5 % (ref 37.5–51)
HGB BLD-MCNC: 17 G/DL (ref 13–17.7)
IMM GRANULOCYTES # BLD AUTO: 0.08 10*3/MM3 (ref 0–0.05)
IMM GRANULOCYTES NFR BLD AUTO: 0.9 % (ref 0–0.5)
LYMPHOCYTES # BLD AUTO: 2.2 10*3/MM3 (ref 0.7–3.1)
LYMPHOCYTES NFR BLD AUTO: 25.2 % (ref 19.6–45.3)
MCH RBC QN AUTO: 28.6 PG (ref 26.6–33)
MCHC RBC AUTO-ENTMCNC: 33.7 G/DL (ref 31.5–35.7)
MCV RBC AUTO: 84.9 FL (ref 79–97)
MONOCYTES # BLD AUTO: 1.12 10*3/MM3 (ref 0.1–0.9)
MONOCYTES NFR BLD AUTO: 12.8 % (ref 5–12)
NEUTROPHILS # BLD AUTO: 5.04 10*3/MM3 (ref 1.7–7)
NEUTROPHILS NFR BLD AUTO: 57.7 % (ref 42.7–76)
NRBC BLD AUTO-RTO: 0 /100 WBC (ref 0–0.2)
PLATELET # BLD AUTO: 274 10*3/MM3 (ref 140–450)
PMV BLD AUTO: 9.3 FL (ref 6–12)
RBC # BLD AUTO: 5.95 10*6/MM3 (ref 4.14–5.8)
WBC NRBC COR # BLD: 8.74 10*3/MM3 (ref 3.4–10.8)

## 2020-04-07 PROCEDURE — 99195 PHLEBOTOMY: CPT

## 2020-04-07 PROCEDURE — 36415 COLL VENOUS BLD VENIPUNCTURE: CPT

## 2020-04-07 PROCEDURE — 85025 COMPLETE CBC W/AUTO DIFF WBC: CPT

## 2020-04-07 RX ORDER — SODIUM CHLORIDE 9 MG/ML
250 INJECTION, SOLUTION INTRAVENOUS ONCE
Status: CANCELLED | OUTPATIENT
Start: 2020-04-07

## 2020-05-27 ENCOUNTER — TRANSCRIBE ORDERS (OUTPATIENT)
Dept: ADMINISTRATIVE | Facility: HOSPITAL | Age: 75
End: 2020-05-27

## 2020-05-27 ENCOUNTER — LAB (OUTPATIENT)
Dept: LAB | Facility: HOSPITAL | Age: 75
End: 2020-05-27

## 2020-05-27 DIAGNOSIS — N20.2 CALCULUS OF KIDNEY AND URETER: Primary | ICD-10-CM

## 2020-05-27 DIAGNOSIS — N20.2 CALCULUS OF KIDNEY AND URETER: ICD-10-CM

## 2020-05-27 LAB
ANION GAP SERPL CALCULATED.3IONS-SCNC: 7.3 MMOL/L (ref 5–15)
BASOPHILS # BLD AUTO: 0.1 10*3/MM3 (ref 0–0.2)
BASOPHILS NFR BLD AUTO: 1.2 % (ref 0–1.5)
BUN BLD-MCNC: 19 MG/DL (ref 8–23)
BUN/CREAT SERPL: 17.6 (ref 7–25)
CALCIUM SPEC-SCNC: 9.4 MG/DL (ref 8.6–10.5)
CHLORIDE SERPL-SCNC: 106 MMOL/L (ref 98–107)
CO2 SERPL-SCNC: 26.7 MMOL/L (ref 22–29)
CREAT BLD-MCNC: 1.08 MG/DL (ref 0.76–1.27)
DEPRECATED RDW RBC AUTO: 42.2 FL (ref 37–54)
EOSINOPHIL # BLD AUTO: 0.23 10*3/MM3 (ref 0–0.4)
EOSINOPHIL NFR BLD AUTO: 2.8 % (ref 0.3–6.2)
ERYTHROCYTE [DISTWIDTH] IN BLOOD BY AUTOMATED COUNT: 13.6 % (ref 12.3–15.4)
GFR SERPL CREATININE-BSD FRML MDRD: 67 ML/MIN/1.73
GLUCOSE BLD-MCNC: 91 MG/DL (ref 65–99)
HCT VFR BLD AUTO: 46.4 % (ref 37.5–51)
HGB BLD-MCNC: 15.6 G/DL (ref 13–17.7)
IMM GRANULOCYTES # BLD AUTO: 0.08 10*3/MM3 (ref 0–0.05)
IMM GRANULOCYTES NFR BLD AUTO: 1 % (ref 0–0.5)
LYMPHOCYTES # BLD AUTO: 1.9 10*3/MM3 (ref 0.7–3.1)
LYMPHOCYTES NFR BLD AUTO: 22.9 % (ref 19.6–45.3)
MCH RBC QN AUTO: 28.3 PG (ref 26.6–33)
MCHC RBC AUTO-ENTMCNC: 33.6 G/DL (ref 31.5–35.7)
MCV RBC AUTO: 84.2 FL (ref 79–97)
MONOCYTES # BLD AUTO: 1.13 10*3/MM3 (ref 0.1–0.9)
MONOCYTES NFR BLD AUTO: 13.6 % (ref 5–12)
NEUTROPHILS # BLD AUTO: 4.87 10*3/MM3 (ref 1.7–7)
NEUTROPHILS NFR BLD AUTO: 58.5 % (ref 42.7–76)
NRBC BLD AUTO-RTO: 0 /100 WBC (ref 0–0.2)
PLATELET # BLD AUTO: 286 10*3/MM3 (ref 140–450)
PMV BLD AUTO: 10 FL (ref 6–12)
POTASSIUM BLD-SCNC: 4.6 MMOL/L (ref 3.5–5.2)
RBC # BLD AUTO: 5.51 10*6/MM3 (ref 4.14–5.8)
SODIUM BLD-SCNC: 140 MMOL/L (ref 136–145)
WBC NRBC COR # BLD: 8.31 10*3/MM3 (ref 3.4–10.8)

## 2020-05-27 PROCEDURE — 85025 COMPLETE CBC W/AUTO DIFF WBC: CPT

## 2020-05-27 PROCEDURE — 80048 BASIC METABOLIC PNL TOTAL CA: CPT

## 2020-05-27 PROCEDURE — 36415 COLL VENOUS BLD VENIPUNCTURE: CPT

## 2020-06-02 ENCOUNTER — LAB (OUTPATIENT)
Dept: LAB | Facility: HOSPITAL | Age: 75
End: 2020-06-02

## 2020-06-02 ENCOUNTER — INFUSION (OUTPATIENT)
Dept: ONCOLOGY | Facility: HOSPITAL | Age: 75
End: 2020-06-02

## 2020-06-02 VITALS
HEART RATE: 71 BPM | TEMPERATURE: 98.6 F | OXYGEN SATURATION: 95 % | WEIGHT: 266 LBS | DIASTOLIC BLOOD PRESSURE: 90 MMHG | SYSTOLIC BLOOD PRESSURE: 175 MMHG | BODY MASS INDEX: 37.24 KG/M2

## 2020-06-02 DIAGNOSIS — N20.2 CALCULUS OF KIDNEY AND URETER: Primary | ICD-10-CM

## 2020-06-02 DIAGNOSIS — D75.1 ERYTHROCYTOSIS: ICD-10-CM

## 2020-06-02 LAB
BASOPHILS # BLD AUTO: 0.11 10*3/MM3 (ref 0–0.2)
BASOPHILS NFR BLD AUTO: 1.1 % (ref 0–1.5)
DEPRECATED RDW RBC AUTO: 43.6 FL (ref 37–54)
EOSINOPHIL # BLD AUTO: 0.32 10*3/MM3 (ref 0–0.4)
EOSINOPHIL NFR BLD AUTO: 3.2 % (ref 0.3–6.2)
ERYTHROCYTE [DISTWIDTH] IN BLOOD BY AUTOMATED COUNT: 14.1 % (ref 12.3–15.4)
HCT VFR BLD AUTO: 48.8 % (ref 37.5–51)
HGB BLD-MCNC: 16.1 G/DL (ref 13–17.7)
IMM GRANULOCYTES # BLD AUTO: 0.17 10*3/MM3 (ref 0–0.05)
IMM GRANULOCYTES NFR BLD AUTO: 1.7 % (ref 0–0.5)
LYMPHOCYTES # BLD AUTO: 2.22 10*3/MM3 (ref 0.7–3.1)
LYMPHOCYTES NFR BLD AUTO: 22 % (ref 19.6–45.3)
MCH RBC QN AUTO: 28.2 PG (ref 26.6–33)
MCHC RBC AUTO-ENTMCNC: 33 G/DL (ref 31.5–35.7)
MCV RBC AUTO: 85.6 FL (ref 79–97)
MONOCYTES # BLD AUTO: 1.29 10*3/MM3 (ref 0.1–0.9)
MONOCYTES NFR BLD AUTO: 12.8 % (ref 5–12)
NEUTROPHILS # BLD AUTO: 5.96 10*3/MM3 (ref 1.7–7)
NEUTROPHILS NFR BLD AUTO: 59.2 % (ref 42.7–76)
NRBC BLD AUTO-RTO: 0 /100 WBC (ref 0–0.2)
PLATELET # BLD AUTO: 249 10*3/MM3 (ref 140–450)
PMV BLD AUTO: 9.4 FL (ref 6–12)
RBC # BLD AUTO: 5.7 10*6/MM3 (ref 4.14–5.8)
WBC NRBC COR # BLD: 10.07 10*3/MM3 (ref 3.4–10.8)

## 2020-06-02 PROCEDURE — 99195 PHLEBOTOMY: CPT

## 2020-06-02 PROCEDURE — 36415 COLL VENOUS BLD VENIPUNCTURE: CPT

## 2020-06-02 PROCEDURE — 85025 COMPLETE CBC W/AUTO DIFF WBC: CPT

## 2020-06-24 ENCOUNTER — LAB REQUISITION (OUTPATIENT)
Dept: LAB | Facility: HOSPITAL | Age: 75
End: 2020-06-24

## 2020-06-24 DIAGNOSIS — N20.2 CALCULUS OF KIDNEY WITH CALCULUS OF URETER: ICD-10-CM

## 2020-06-24 PROCEDURE — 82365 CALCULUS SPECTROSCOPY: CPT | Performed by: UROLOGY

## 2020-07-08 LAB
COD CRY STONE QL IR: 20 %
COLOR STONE: NORMAL
COM CRY STONE QL IR: 80 %
COMPN STONE: NORMAL
LABORATORY COMMENT REPORT: NORMAL
Lab: NORMAL
Lab: NORMAL
PHOTO: NORMAL
SIZE STONE: NORMAL MM
SPECIMEN SOURCE: NORMAL
WT STONE: 30 MG

## 2020-08-18 ENCOUNTER — APPOINTMENT (OUTPATIENT)
Dept: ONCOLOGY | Facility: HOSPITAL | Age: 75
End: 2020-08-18

## 2020-08-18 ENCOUNTER — APPOINTMENT (OUTPATIENT)
Dept: LAB | Facility: HOSPITAL | Age: 75
End: 2020-08-18

## 2020-09-01 ENCOUNTER — INFUSION (OUTPATIENT)
Dept: ONCOLOGY | Facility: HOSPITAL | Age: 75
End: 2020-09-01

## 2020-09-01 ENCOUNTER — LAB (OUTPATIENT)
Dept: LAB | Facility: HOSPITAL | Age: 75
End: 2020-09-01

## 2020-09-01 ENCOUNTER — OFFICE VISIT (OUTPATIENT)
Dept: ONCOLOGY | Facility: CLINIC | Age: 75
End: 2020-09-01

## 2020-09-01 VITALS
SYSTOLIC BLOOD PRESSURE: 120 MMHG | RESPIRATION RATE: 18 BRPM | HEIGHT: 71 IN | DIASTOLIC BLOOD PRESSURE: 66 MMHG | WEIGHT: 267.7 LBS | OXYGEN SATURATION: 94 % | BODY MASS INDEX: 37.48 KG/M2 | HEART RATE: 65 BPM | TEMPERATURE: 97.5 F

## 2020-09-01 DIAGNOSIS — D75.1 ERYTHROCYTOSIS: ICD-10-CM

## 2020-09-01 DIAGNOSIS — N20.2 CALCULUS OF KIDNEY WITH CALCULUS OF URETER: Primary | ICD-10-CM

## 2020-09-01 DIAGNOSIS — D75.1 ERYTHROCYTOSIS: Primary | ICD-10-CM

## 2020-09-01 DIAGNOSIS — R79.9 ABNORMAL FINDING OF BLOOD CHEMISTRY, UNSPECIFIED: ICD-10-CM

## 2020-09-01 LAB
BASOPHILS # BLD AUTO: 0.09 10*3/MM3 (ref 0–0.2)
BASOPHILS NFR BLD AUTO: 0.9 % (ref 0–1.5)
DEPRECATED RDW RBC AUTO: 44.9 FL (ref 37–54)
EOSINOPHIL # BLD AUTO: 0.29 10*3/MM3 (ref 0–0.4)
EOSINOPHIL NFR BLD AUTO: 3 % (ref 0.3–6.2)
ERYTHROCYTE [DISTWIDTH] IN BLOOD BY AUTOMATED COUNT: 15.6 % (ref 12.3–15.4)
FERRITIN SERPL-MCNC: 22.4 NG/ML (ref 30–400)
HCT VFR BLD AUTO: 49.7 % (ref 37.5–51)
HGB BLD-MCNC: 16.4 G/DL (ref 13–17.7)
IMM GRANULOCYTES # BLD AUTO: 0.1 10*3/MM3 (ref 0–0.05)
IMM GRANULOCYTES NFR BLD AUTO: 1 % (ref 0–0.5)
LYMPHOCYTES # BLD AUTO: 2.2 10*3/MM3 (ref 0.7–3.1)
LYMPHOCYTES NFR BLD AUTO: 23.1 % (ref 19.6–45.3)
MCH RBC QN AUTO: 27 PG (ref 26.6–33)
MCHC RBC AUTO-ENTMCNC: 33 G/DL (ref 31.5–35.7)
MCV RBC AUTO: 81.7 FL (ref 79–97)
MONOCYTES # BLD AUTO: 1.39 10*3/MM3 (ref 0.1–0.9)
MONOCYTES NFR BLD AUTO: 14.6 % (ref 5–12)
NEUTROPHILS NFR BLD AUTO: 5.47 10*3/MM3 (ref 1.7–7)
NEUTROPHILS NFR BLD AUTO: 57.4 % (ref 42.7–76)
NRBC BLD AUTO-RTO: 0 /100 WBC (ref 0–0.2)
PLATELET # BLD AUTO: 160 10*3/MM3 (ref 140–450)
PMV BLD AUTO: 10.6 FL (ref 6–12)
RBC # BLD AUTO: 6.08 10*6/MM3 (ref 4.14–5.8)
WBC # BLD AUTO: 9.54 10*3/MM3 (ref 3.4–10.8)

## 2020-09-01 PROCEDURE — 85025 COMPLETE CBC W/AUTO DIFF WBC: CPT

## 2020-09-01 PROCEDURE — 82728 ASSAY OF FERRITIN: CPT | Performed by: INTERNAL MEDICINE

## 2020-09-01 PROCEDURE — 99195 PHLEBOTOMY: CPT

## 2020-09-01 PROCEDURE — 99214 OFFICE O/P EST MOD 30 MIN: CPT | Performed by: INTERNAL MEDICINE

## 2020-09-01 PROCEDURE — 36415 COLL VENOUS BLD VENIPUNCTURE: CPT

## 2020-09-01 RX ORDER — SODIUM CHLORIDE 9 MG/ML
250 INJECTION, SOLUTION INTRAVENOUS ONCE
Status: CANCELLED | OUTPATIENT
Start: 2020-09-01

## 2020-09-02 LAB — ETHNIC BACKGROUND STATED: 14.4 MIU/ML (ref 2.6–18.5)

## 2020-10-06 LAB — CALR EXON 9 MUT ANL BLD/T: NORMAL

## 2020-10-26 RX ORDER — SODIUM CHLORIDE 9 MG/ML
250 INJECTION, SOLUTION INTRAVENOUS ONCE
Status: CANCELLED | OUTPATIENT
Start: 2020-10-26

## 2020-10-27 ENCOUNTER — INFUSION (OUTPATIENT)
Dept: ONCOLOGY | Facility: HOSPITAL | Age: 75
End: 2020-10-27

## 2020-10-27 ENCOUNTER — LAB (OUTPATIENT)
Dept: LAB | Facility: HOSPITAL | Age: 75
End: 2020-10-27

## 2020-10-27 VITALS
BODY MASS INDEX: 37.13 KG/M2 | RESPIRATION RATE: 16 BRPM | TEMPERATURE: 96.9 F | HEART RATE: 66 BPM | WEIGHT: 265.2 LBS | OXYGEN SATURATION: 93 % | SYSTOLIC BLOOD PRESSURE: 123 MMHG | DIASTOLIC BLOOD PRESSURE: 72 MMHG

## 2020-10-27 DIAGNOSIS — D75.1 ERYTHROCYTOSIS: Primary | ICD-10-CM

## 2020-10-27 LAB
BASOPHILS # BLD AUTO: 0.1 10*3/MM3 (ref 0–0.2)
BASOPHILS NFR BLD AUTO: 0.9 % (ref 0–1.5)
DEPRECATED RDW RBC AUTO: 47.3 FL (ref 37–54)
EOSINOPHIL # BLD AUTO: 0.22 10*3/MM3 (ref 0–0.4)
EOSINOPHIL NFR BLD AUTO: 2.1 % (ref 0.3–6.2)
ERYTHROCYTE [DISTWIDTH] IN BLOOD BY AUTOMATED COUNT: 15.8 % (ref 12.3–15.4)
HCT VFR BLD AUTO: 49.7 % (ref 37.5–51)
HGB BLD-MCNC: 16.3 G/DL (ref 13–17.7)
IMM GRANULOCYTES # BLD AUTO: 0.09 10*3/MM3 (ref 0–0.05)
IMM GRANULOCYTES NFR BLD AUTO: 0.8 % (ref 0–0.5)
LYMPHOCYTES # BLD AUTO: 2.16 10*3/MM3 (ref 0.7–3.1)
LYMPHOCYTES NFR BLD AUTO: 20.3 % (ref 19.6–45.3)
MCH RBC QN AUTO: 27.3 PG (ref 26.6–33)
MCHC RBC AUTO-ENTMCNC: 32.8 G/DL (ref 31.5–35.7)
MCV RBC AUTO: 83.2 FL (ref 79–97)
MONOCYTES # BLD AUTO: 1.21 10*3/MM3 (ref 0.1–0.9)
MONOCYTES NFR BLD AUTO: 11.4 % (ref 5–12)
NEUTROPHILS NFR BLD AUTO: 6.86 10*3/MM3 (ref 1.7–7)
NEUTROPHILS NFR BLD AUTO: 64.5 % (ref 42.7–76)
NRBC BLD AUTO-RTO: 0 /100 WBC (ref 0–0.2)
PLATELET # BLD AUTO: 270 10*3/MM3 (ref 140–450)
PMV BLD AUTO: 8.9 FL (ref 6–12)
RBC # BLD AUTO: 5.97 10*6/MM3 (ref 4.14–5.8)
WBC # BLD AUTO: 10.64 10*3/MM3 (ref 3.4–10.8)

## 2020-10-27 PROCEDURE — 99195 PHLEBOTOMY: CPT

## 2020-10-27 PROCEDURE — 36415 COLL VENOUS BLD VENIPUNCTURE: CPT

## 2020-10-27 PROCEDURE — 85025 COMPLETE CBC W/AUTO DIFF WBC: CPT

## 2020-10-27 RX ORDER — SODIUM CHLORIDE 9 MG/ML
250 INJECTION, SOLUTION INTRAVENOUS ONCE
Status: CANCELLED | OUTPATIENT
Start: 2020-10-27

## 2020-12-22 ENCOUNTER — INFUSION (OUTPATIENT)
Dept: ONCOLOGY | Facility: HOSPITAL | Age: 75
End: 2020-12-22

## 2020-12-22 ENCOUNTER — LAB (OUTPATIENT)
Dept: LAB | Facility: HOSPITAL | Age: 75
End: 2020-12-22

## 2020-12-22 VITALS
HEART RATE: 68 BPM | RESPIRATION RATE: 18 BRPM | WEIGHT: 268.6 LBS | OXYGEN SATURATION: 95 % | BODY MASS INDEX: 37.6 KG/M2 | SYSTOLIC BLOOD PRESSURE: 126 MMHG | DIASTOLIC BLOOD PRESSURE: 76 MMHG | TEMPERATURE: 97.3 F

## 2020-12-22 DIAGNOSIS — D75.1 ERYTHROCYTOSIS: Primary | ICD-10-CM

## 2020-12-22 DIAGNOSIS — D75.1 ERYTHROCYTOSIS: ICD-10-CM

## 2020-12-22 LAB
BASOPHILS # BLD AUTO: 0.13 10*3/MM3 (ref 0–0.2)
BASOPHILS NFR BLD AUTO: 1.1 % (ref 0–1.5)
DEPRECATED RDW RBC AUTO: 45.3 FL (ref 37–54)
EOSINOPHIL # BLD AUTO: 0.2 10*3/MM3 (ref 0–0.4)
EOSINOPHIL NFR BLD AUTO: 1.8 % (ref 0.3–6.2)
ERYTHROCYTE [DISTWIDTH] IN BLOOD BY AUTOMATED COUNT: 15.7 % (ref 12.3–15.4)
HCT VFR BLD AUTO: 49.5 % (ref 37.5–51)
HGB BLD-MCNC: 16.2 G/DL (ref 13–17.7)
IMM GRANULOCYTES # BLD AUTO: 0.13 10*3/MM3 (ref 0–0.05)
IMM GRANULOCYTES NFR BLD AUTO: 1.1 % (ref 0–0.5)
LYMPHOCYTES # BLD AUTO: 2.27 10*3/MM3 (ref 0.7–3.1)
LYMPHOCYTES NFR BLD AUTO: 19.9 % (ref 19.6–45.3)
MCH RBC QN AUTO: 27 PG (ref 26.6–33)
MCHC RBC AUTO-ENTMCNC: 32.7 G/DL (ref 31.5–35.7)
MCV RBC AUTO: 82.6 FL (ref 79–97)
MONOCYTES # BLD AUTO: 1.49 10*3/MM3 (ref 0.1–0.9)
MONOCYTES NFR BLD AUTO: 13.1 % (ref 5–12)
NEUTROPHILS NFR BLD AUTO: 63 % (ref 42.7–76)
NEUTROPHILS NFR BLD AUTO: 7.18 10*3/MM3 (ref 1.7–7)
NRBC BLD AUTO-RTO: 0 /100 WBC (ref 0–0.2)
PLATELET # BLD AUTO: 263 10*3/MM3 (ref 140–450)
PMV BLD AUTO: 9.5 FL (ref 6–12)
RBC # BLD AUTO: 5.99 10*6/MM3 (ref 4.14–5.8)
WBC # BLD AUTO: 11.4 10*3/MM3 (ref 3.4–10.8)

## 2020-12-22 PROCEDURE — 36415 COLL VENOUS BLD VENIPUNCTURE: CPT

## 2020-12-22 PROCEDURE — 85025 COMPLETE CBC W/AUTO DIFF WBC: CPT

## 2020-12-22 PROCEDURE — 99195 PHLEBOTOMY: CPT

## 2021-02-11 ENCOUNTER — TELEPHONE (OUTPATIENT)
Dept: ONCOLOGY | Facility: CLINIC | Age: 76
End: 2021-02-11

## 2021-02-11 NOTE — TELEPHONE ENCOUNTER
Caller: HAKEEM REYNA     Relationship to patient: SELF  Best call back number: [phone]    Chief complaint: NEEDS TO R/S APT 02/16/21 LAB/FU/INFUSION    Type of visit: LAB/FU/INFUSION  Requested date: AS SOON AS POSSIBLE      If rescheduling, when is the original appointment: 02/16/21    Additional notes:

## 2021-02-16 ENCOUNTER — APPOINTMENT (OUTPATIENT)
Dept: LAB | Facility: HOSPITAL | Age: 76
End: 2021-02-16

## 2021-02-16 ENCOUNTER — APPOINTMENT (OUTPATIENT)
Dept: ONCOLOGY | Facility: HOSPITAL | Age: 76
End: 2021-02-16

## 2021-02-23 NOTE — PROGRESS NOTES
Subjective     REASON FOR FOLLOWUP:   1  Erythrocytosis left shifted oxygen dissociation curve?  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  Bilateral kidney cysts  3.  Persistent elevated carboxyhemoglobin with no obvious cause                             REQUESTING PHYSICIAN: Alessandra ANDUJAR    History of Present Illness Mr. Medel is a mary 75 y.o. male with the above-mentioned history here today for follow-up.  He is doing well.  His numbers have remained much more stable receiving phlebotomies every 2 months.  Hematocrit is 48.7 today we discussed phlebotomizing him with a goal to try to keep him under 48 if possible.    The patient just completed his second Covid vaccine last week.  He did have some very mild symptoms of chilling but this was short-lived.  He is feeling fine now. He denies other concerns at this time.    Of note, when patient was seen in September 2020, we did check a JACOB reticulin mutation analysis because of persistent erythrocytosis.  This was not detected.    Past Medical History:   Diagnosis Date   • CAD (coronary artery disease)    • H/O Blood dyscrasia    • History of snoring    • Hyperlipidemia    • Hypertension    • Kidney stone    • Low hemoglobin    • Myocardial infarction (CMS/HCC) 09/22/2018    placed one stent   • Seasonal allergies    • Skin cancer     Left forearm    skin cancers for which he takes light treatments periodically    Past Surgical History:   Procedure Laterality Date   • CARDIAC CATHETERIZATION  2018   • CATARACT EXTRACTION     • COLONOSCOPY      x4   • CORONARY STENT PLACEMENT  2018   • ENDOSCOPY  1972   • HERNIA REPAIR  1995   • KIDNEY STONE SURGERY  2006   • SKIN CANCER EXCISION      Cheek-squamous cell and top of head-basal cell carcioma   • URETEROSCOPY Right    • VASECTOMY  1982        HEME HISTORY:  patient is a 70-year-old male fairly good health except for hypertension and hypercholesterolemia and kidney stones was noted on recent blood work to have an  elevated hemoglobin and hematocrit of 17/50% on 2 or 3 occasions.  Patient was referred to us for evaluation of this.  He has had some recent headaches and lightheadedness which she attributes to his blood pressure medicines but otherwise feels well.  His note night sweats weight loss or loss of appetite or pain anywhere.  He has been on the same blood pressure medicines for many years.  On questioning him further he reports that when he was in his early 20s he works as a  and his hemoglobin was high in the 16 g range even then and he thinks this may be a long term problem that he has had.  His 2 children and 3 sisters but does not know of anybody else has an elevated hemoglobin.  He is not a smoker does not have sleep apnea and has a carbon monoxide monitoring in his home.  He has had no recent CAT scans of the abdomen no hematuria or kidney disease.  He is not taking any testosterone injections or preparations.    71-year-old male with long-standing erythrocytosis returns today to review the results of his blood work and imaging.  We did a chest x-ray because he complained of a chronic cough and this is thankfully benign except for some mild hyperinflation/COPD which is unusual as he is a nonsmoker.  Suspect the lisinopril is causing his cough.  Ultrasound of kidneys showed bilateral large kidney cysts but no masses.  His erythropoietin level was low at 5 and his, carbon monoxide level was mildly elevated 2.4 and his P 50 was left shifted suggesting a possible-high affinity hemoglobin!  His tolerating phlebotomy well and he actually felt better after the phlebotomy and therefore we will continue for the time being although I did tell him that I would repeat his carbon monoxide level to make sure it is back down as he got a new furnace for his home and this may cause some changes.  And he could get his phlebotomies at the [facility] because he has no evidence of polycythemia vera/ bone marrow disorder at  this point.        Current Outpatient Medications:   •  aspirin 81 MG disintegrating tablet, Take 81 mg by mouth., Disp: , Rfl:   •  atorvastatin (LIPITOR) 40 MG tablet, 40 mg., Disp: , Rfl:   •  baclofen (LIORESAL) 10 MG tablet, Every 8 (Eight) Hours., Disp: , Rfl:   •  clopidogrel (PLAVIX) 75 MG tablet, , Disp: , Rfl:   •  hydrocortisone 2.5 % cream, , Disp: , Rfl:   •  lisinopril (PRINIVIL,ZESTRIL) 10 MG tablet, , Disp: , Rfl:   •  metoprolol succinate XL (TOPROL-XL) 12.5 MG 24 hr half tablet, Take 25 mg by mouth Daily., Disp: , Rfl:   •  nitroglycerin (NITROSTAT) 0.4 MG SL tablet, Place 0.4 mg under the tongue Every 5 (Five) Minutes As Needed for Chest Pain. Take no more than 3 doses in 15 minutes., Disp: , Rfl:   •  omega-3 acid ethyl esters (LOVAZA) 1 G capsule, , Disp: , Rfl:   •  polyethylene glycol (MIRALAX) packet, Take 17 g by mouth Daily., Disp: , Rfl:   •  prednisoLONE acetate (PRED FORTE) 1 % ophthalmic suspension, prednisolone acetate 1 % eye drops,suspension, Disp: , Rfl:   •  predniSONE (DELTASONE) 10 MG tablet, prednisone 10 mg tablet  4 tab x 3 days, 3 tab x 3 days, 2 tab x 3 days, 1 tab x 3 day, Disp: , Rfl:   •  Psyllium (METAMUCIL MULTIHEALTH FIBER PO), Metamucil MultiHealth Fiber, Disp: , Rfl:   •  Triamcinolone Acetonide (NASACORT ALLERGY 24HR) 55 MCG/ACT nasal inhaler, 2 sprays into the nostril(s) as directed by provider Daily., Disp: , Rfl:     ALLERGIES:    Allergies   Allergen Reactions   • Erythromycin Unknown - Low Severity   • Sulfa Antibiotics Unknown - Low Severity   • Amoxicillin-Pot Clavulanate GI Intolerance     Stomach cramping--amoxicillin is OK          Social History     Socioeconomic History   • Marital status:      Spouse name: Radha   • Number of children: Not on file   • Years of education: High school   • Highest education level: Not on file   Occupational History   • Occupation: [employer] maintenance`     Employer: RETIRED     Comment: [employer]   Tobacco Use   •  "Smoking status: Never Smoker   • Smokeless tobacco: Never Used   Substance and Sexual Activity   • Alcohol use: Yes     Comment: Occasionally   • Drug use: No   • Sexual activity: Defer     does not smoke or drink no risk factors for HIV worked in [employer] has no chemicals or toxic exposure     Family History   Problem Relation Age of Onset   • Heart disease Mother    • Hypertension Mother    • Cancer Son 43        Squamous cell CA in tonsils      30-year-old son had squamous cell carcinoma of the tonsil 7 years ago and is JESSIE with no other malignancy in the family -nobody has a high hemoglobin in the family     Review of Systems   Constitutional: Negative for activity change, appetite change, chills, fatigue and fever.   HENT: Negative for mouth sores, nosebleeds and trouble swallowing.    Respiratory: Negative for cough and shortness of breath.    Cardiovascular: Negative for chest pain and leg swelling.   Gastrointestinal: Negative for abdominal pain, constipation, diarrhea, nausea and vomiting.   Genitourinary: Negative for difficulty urinating.   Skin: Negative for rash.   Neurological: Negative for dizziness, weakness and numbness.   Hematological: Negative for adenopathy. Does not bruise/bleed easily.   Psychiatric/Behavioral: Negative for sleep disturbance.   All other systems reviewed and are negative.  I have reviewed and confirmed the accuracy of the ROS as documented  Carla Clark, APRN      Objective     Vitals:    02/25/21 0915   BP: 141/72   Pulse: 57   Resp: 18   Temp: 97.3 °F (36.3 °C)   TempSrc: Temporal   SpO2: 95%   Weight: 122 kg (268 lb 6.4 oz)   Height: 180 cm (70.87\")   PainSc: 0-No pain     Current Status 2/25/2021   ECOG score 0     PHYSICAL EXAM:  GENERAL:  Well-developed, well-nourished in no acute distress.   SKIN:  Warm, dry without rashes, purpura or petechiae.   EYES:  Pupils equal, round and reactive to light.  EOMs intact.  Conjunctivae normal.  EARS:  " Hearing intact.  NOSE:  Septum midline.  No excoriations or nasal discharge.  MOUTH:  Tongue is well-papillated; no stomatitis or ulcers.  Lips normal.  THROAT:  Oropharynx without lesions or exudates.  NECK:  Supple with good range of motion; no thyromegaly or masses, no JVD.  LYMPHATICS:  No cervical, supraclavicular, axillary or inguinal adenopathy.  CHEST:  Lungs clear to auscultation. Good airflow.  CARDIAC:  Regular rate and rhythm without murmurs, rubs or gallops. Normal S1,S2.  ABDOMEN:  Soft, nontender with no hepatosplenomegaly or masses.  EXTREMITIES:  No clubbing, cyanosis or edema.  NEUROLOGICAL:  Cranial Nerves II-XII grossly intact.  No focal neurological deficits.  PSYCHIATRIC:  Normal affect and mood.      I have reexamined the patient and the results are consistent with the previously documented exam. Carla Clark, LEONEL       RECENT LABS:  Results from last 7 days   Lab Units 02/25/21  0904   WBC 10*3/mm3 8.83   NEUTROS ABS 10*3/mm3 5.38   HEMOGLOBIN g/dL 15.8   HEMATOCRIT % 48.7   PLATELETS 10*3/mm3 216                     Assessment/Plan     1.  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    Previously receiving phlebotomies every 2 months and was tolerating well. Had a heart attack in 9/2018 with a hematocrit of 45%.     For a time we did move him to every 3-month phlebotomies however his hematocrit began running around 50%.  We moved him back to every 2 months in 2020 and this has been keeping his hematocrit closer to 48%.    Hematocrit is 48.7% today we will phlebotomize him with the goal to keep him under 48% if possible.  He is doing well and essentially asymptomatic.  He does occasionally feel more sluggish if hematocrit gets higher.      PLAN:  1.  Hematocrit 48.7% today, proceed with phlebotomy.  2.  CBC q.  2 months possible  phlebotomy  3.  Patient otherwise scheduled to follow-up with Dr. Andino in August 2021 for 6-month follow-up.    Patient encouraged to call with any questions or concerns prior to scheduled return.  He is tolerating phlebotomies well and his numbers remain more stable and every 2-month phlebotomy schedule.

## 2021-02-25 ENCOUNTER — OFFICE VISIT (OUTPATIENT)
Dept: ONCOLOGY | Facility: CLINIC | Age: 76
End: 2021-02-25

## 2021-02-25 ENCOUNTER — LAB (OUTPATIENT)
Dept: LAB | Facility: HOSPITAL | Age: 76
End: 2021-02-25

## 2021-02-25 ENCOUNTER — INFUSION (OUTPATIENT)
Dept: ONCOLOGY | Facility: HOSPITAL | Age: 76
End: 2021-02-25

## 2021-02-25 VITALS
TEMPERATURE: 97.3 F | RESPIRATION RATE: 18 BRPM | OXYGEN SATURATION: 95 % | WEIGHT: 268.4 LBS | SYSTOLIC BLOOD PRESSURE: 141 MMHG | BODY MASS INDEX: 37.57 KG/M2 | DIASTOLIC BLOOD PRESSURE: 72 MMHG | HEART RATE: 57 BPM | HEIGHT: 71 IN

## 2021-02-25 VITALS — DIASTOLIC BLOOD PRESSURE: 78 MMHG | SYSTOLIC BLOOD PRESSURE: 139 MMHG | HEART RATE: 59 BPM

## 2021-02-25 DIAGNOSIS — D75.1 ERYTHROCYTOSIS: Primary | ICD-10-CM

## 2021-02-25 LAB
BASOPHILS # BLD AUTO: 0.12 10*3/MM3 (ref 0–0.2)
BASOPHILS NFR BLD AUTO: 1.4 % (ref 0–1.5)
DEPRECATED RDW RBC AUTO: 44.7 FL (ref 37–54)
EOSINOPHIL # BLD AUTO: 0.23 10*3/MM3 (ref 0–0.4)
EOSINOPHIL NFR BLD AUTO: 2.6 % (ref 0.3–6.2)
ERYTHROCYTE [DISTWIDTH] IN BLOOD BY AUTOMATED COUNT: 15.4 % (ref 12.3–15.4)
HCT VFR BLD AUTO: 48.7 % (ref 37.5–51)
HGB BLD-MCNC: 15.8 G/DL (ref 13–17.7)
IMM GRANULOCYTES # BLD AUTO: 0.12 10*3/MM3 (ref 0–0.05)
IMM GRANULOCYTES NFR BLD AUTO: 1.4 % (ref 0–0.5)
LYMPHOCYTES # BLD AUTO: 2 10*3/MM3 (ref 0.7–3.1)
LYMPHOCYTES NFR BLD AUTO: 22.7 % (ref 19.6–45.3)
MCH RBC QN AUTO: 26.7 PG (ref 26.6–33)
MCHC RBC AUTO-ENTMCNC: 32.4 G/DL (ref 31.5–35.7)
MCV RBC AUTO: 82.3 FL (ref 79–97)
MONOCYTES # BLD AUTO: 0.98 10*3/MM3 (ref 0.1–0.9)
MONOCYTES NFR BLD AUTO: 11.1 % (ref 5–12)
NEUTROPHILS NFR BLD AUTO: 5.38 10*3/MM3 (ref 1.7–7)
NEUTROPHILS NFR BLD AUTO: 60.8 % (ref 42.7–76)
NRBC BLD AUTO-RTO: 0 /100 WBC (ref 0–0.2)
PLATELET # BLD AUTO: 216 10*3/MM3 (ref 140–450)
PMV BLD AUTO: 10.1 FL (ref 6–12)
RBC # BLD AUTO: 5.92 10*6/MM3 (ref 4.14–5.8)
WBC # BLD AUTO: 8.83 10*3/MM3 (ref 3.4–10.8)

## 2021-02-25 PROCEDURE — 99195 PHLEBOTOMY: CPT

## 2021-02-25 PROCEDURE — 85025 COMPLETE CBC W/AUTO DIFF WBC: CPT

## 2021-02-25 PROCEDURE — 99212 OFFICE O/P EST SF 10 MIN: CPT | Performed by: NURSE PRACTITIONER

## 2021-02-25 PROCEDURE — 36415 COLL VENOUS BLD VENIPUNCTURE: CPT

## 2021-02-25 RX ORDER — SODIUM CHLORIDE 9 MG/ML
250 INJECTION, SOLUTION INTRAVENOUS ONCE
Status: CANCELLED | OUTPATIENT
Start: 2021-02-25

## 2021-04-07 RX ORDER — SODIUM CHLORIDE 9 MG/ML
250 INJECTION, SOLUTION INTRAVENOUS ONCE
Status: CANCELLED | OUTPATIENT
Start: 2021-04-13

## 2021-04-13 ENCOUNTER — LAB (OUTPATIENT)
Dept: LAB | Facility: HOSPITAL | Age: 76
End: 2021-04-13

## 2021-04-13 ENCOUNTER — INFUSION (OUTPATIENT)
Dept: ONCOLOGY | Facility: HOSPITAL | Age: 76
End: 2021-04-13

## 2021-04-13 DIAGNOSIS — D75.1 ERYTHROCYTOSIS: Primary | ICD-10-CM

## 2021-04-13 LAB
BASOPHILS # BLD AUTO: 0.11 10*3/MM3 (ref 0–0.2)
BASOPHILS NFR BLD AUTO: 1.1 % (ref 0–1.5)
DEPRECATED RDW RBC AUTO: 46.7 FL (ref 37–54)
EOSINOPHIL # BLD AUTO: 0.3 10*3/MM3 (ref 0–0.4)
EOSINOPHIL NFR BLD AUTO: 2.9 % (ref 0.3–6.2)
ERYTHROCYTE [DISTWIDTH] IN BLOOD BY AUTOMATED COUNT: 15.9 % (ref 12.3–15.4)
HCT VFR BLD AUTO: 47.3 % (ref 37.5–51)
HGB BLD-MCNC: 15.4 G/DL (ref 13–17.7)
IMM GRANULOCYTES # BLD AUTO: 0.09 10*3/MM3 (ref 0–0.05)
IMM GRANULOCYTES NFR BLD AUTO: 0.9 % (ref 0–0.5)
LYMPHOCYTES # BLD AUTO: 2.35 10*3/MM3 (ref 0.7–3.1)
LYMPHOCYTES NFR BLD AUTO: 23.1 % (ref 19.6–45.3)
MCH RBC QN AUTO: 26.7 PG (ref 26.6–33)
MCHC RBC AUTO-ENTMCNC: 32.6 G/DL (ref 31.5–35.7)
MCV RBC AUTO: 82 FL (ref 79–97)
MONOCYTES # BLD AUTO: 1.44 10*3/MM3 (ref 0.1–0.9)
MONOCYTES NFR BLD AUTO: 14.1 % (ref 5–12)
NEUTROPHILS NFR BLD AUTO: 5.89 10*3/MM3 (ref 1.7–7)
NEUTROPHILS NFR BLD AUTO: 57.9 % (ref 42.7–76)
NRBC BLD AUTO-RTO: 0 /100 WBC (ref 0–0.2)
PLATELET # BLD AUTO: 303 10*3/MM3 (ref 140–450)
PMV BLD AUTO: 9.4 FL (ref 6–12)
RBC # BLD AUTO: 5.77 10*6/MM3 (ref 4.14–5.8)
WBC # BLD AUTO: 10.18 10*3/MM3 (ref 3.4–10.8)

## 2021-04-13 PROCEDURE — G0463 HOSPITAL OUTPT CLINIC VISIT: HCPCS

## 2021-04-13 PROCEDURE — 36415 COLL VENOUS BLD VENIPUNCTURE: CPT

## 2021-04-13 PROCEDURE — 85025 COMPLETE CBC W/AUTO DIFF WBC: CPT

## 2021-04-13 NOTE — NURSING NOTE
Lab Results   Component Value Date    WBC 10.18 04/13/2021    HGB 15.4 04/13/2021    HCT 47.3 04/13/2021    MCV 82.0 04/13/2021     04/13/2021     Pt does not require phlebo today, orders to be completed for hct>48%.  Pt without complaints, educated to call if feels like he needs to return sooner than next appt, he v/u

## 2021-04-14 ENCOUNTER — TELEPHONE (OUTPATIENT)
Dept: ONCOLOGY | Facility: CLINIC | Age: 76
End: 2021-04-14

## 2021-04-14 NOTE — TELEPHONE ENCOUNTER
CALLED PT WITH HIS APPT AND HE WANTED TO MAKE SURE THAT HE IS NOT GOING OVER HE DOES NOT WANT TO RISK GOING 3 MTHS BEFORE HE HAS HIS BLOOD CHECK THIS WAS THE SCHEDULE HE WAS ON AND DID NOT WANT TO SWITCH

## 2021-04-14 NOTE — TELEPHONE ENCOUNTER
Caller: HAKEEM    Relationship to patient:     Best call back number: [phone]    Chief complaint: PT DIDN'T WANT TO WAIT 4 MONTHS IN BETWEEN APPT    Type of visit: LAB AND POSS PHLEBO    Requested date: PT WOULD RATHER NOT BE SCHEDULED AFTER NOON (12) ANY DAY    If rescheduling, when is the original appointment: 6/15    Additional notes:    5/20 IS NOT AVAILABLE FOR APPT.

## 2021-05-18 ENCOUNTER — APPOINTMENT (OUTPATIENT)
Dept: LAB | Facility: HOSPITAL | Age: 76
End: 2021-05-18

## 2021-05-18 ENCOUNTER — APPOINTMENT (OUTPATIENT)
Dept: ONCOLOGY | Facility: HOSPITAL | Age: 76
End: 2021-05-18

## 2021-05-21 ENCOUNTER — INFUSION (OUTPATIENT)
Dept: ONCOLOGY | Facility: HOSPITAL | Age: 76
End: 2021-05-21

## 2021-05-21 ENCOUNTER — LAB (OUTPATIENT)
Dept: LAB | Facility: HOSPITAL | Age: 76
End: 2021-05-21

## 2021-05-21 VITALS
RESPIRATION RATE: 16 BRPM | WEIGHT: 270 LBS | HEART RATE: 71 BPM | BODY MASS INDEX: 37.8 KG/M2 | SYSTOLIC BLOOD PRESSURE: 164 MMHG | TEMPERATURE: 96.9 F | DIASTOLIC BLOOD PRESSURE: 74 MMHG | OXYGEN SATURATION: 96 %

## 2021-05-21 DIAGNOSIS — D75.1 ERYTHROCYTOSIS: Primary | ICD-10-CM

## 2021-05-21 LAB
BASOPHILS # BLD AUTO: 0.14 10*3/MM3 (ref 0–0.2)
BASOPHILS NFR BLD AUTO: 1.3 % (ref 0–1.5)
DEPRECATED RDW RBC AUTO: 48.1 FL (ref 37–54)
EOSINOPHIL # BLD AUTO: 0.22 10*3/MM3 (ref 0–0.4)
EOSINOPHIL NFR BLD AUTO: 2 % (ref 0.3–6.2)
ERYTHROCYTE [DISTWIDTH] IN BLOOD BY AUTOMATED COUNT: 17.2 % (ref 12.3–15.4)
HCT VFR BLD AUTO: 49.4 % (ref 37.5–51)
HGB BLD-MCNC: 15.9 G/DL (ref 13–17.7)
IMM GRANULOCYTES # BLD AUTO: 0.13 10*3/MM3 (ref 0–0.05)
IMM GRANULOCYTES NFR BLD AUTO: 1.2 % (ref 0–0.5)
LYMPHOCYTES # BLD AUTO: 2.39 10*3/MM3 (ref 0.7–3.1)
LYMPHOCYTES NFR BLD AUTO: 21.5 % (ref 19.6–45.3)
MCH RBC QN AUTO: 26.3 PG (ref 26.6–33)
MCHC RBC AUTO-ENTMCNC: 32.2 G/DL (ref 31.5–35.7)
MCV RBC AUTO: 81.8 FL (ref 79–97)
MONOCYTES # BLD AUTO: 1.61 10*3/MM3 (ref 0.1–0.9)
MONOCYTES NFR BLD AUTO: 14.5 % (ref 5–12)
NEUTROPHILS NFR BLD AUTO: 59.5 % (ref 42.7–76)
NEUTROPHILS NFR BLD AUTO: 6.64 10*3/MM3 (ref 1.7–7)
NRBC BLD AUTO-RTO: 0 /100 WBC (ref 0–0.2)
PLATELET # BLD AUTO: 248 10*3/MM3 (ref 140–450)
PMV BLD AUTO: 9.9 FL (ref 6–12)
RBC # BLD AUTO: 6.04 10*6/MM3 (ref 4.14–5.8)
WBC # BLD AUTO: 11.13 10*3/MM3 (ref 3.4–10.8)

## 2021-05-21 PROCEDURE — 36415 COLL VENOUS BLD VENIPUNCTURE: CPT

## 2021-05-21 PROCEDURE — 99195 PHLEBOTOMY: CPT

## 2021-05-21 PROCEDURE — 85025 COMPLETE CBC W/AUTO DIFF WBC: CPT

## 2021-05-21 RX ORDER — SODIUM CHLORIDE 9 MG/ML
250 INJECTION, SOLUTION INTRAVENOUS ONCE
Status: CANCELLED | OUTPATIENT
Start: 2021-05-28

## 2021-08-04 ENCOUNTER — OFFICE VISIT (OUTPATIENT)
Dept: ONCOLOGY | Facility: CLINIC | Age: 76
End: 2021-08-04

## 2021-08-04 ENCOUNTER — INFUSION (OUTPATIENT)
Dept: ONCOLOGY | Facility: HOSPITAL | Age: 76
End: 2021-08-04

## 2021-08-04 ENCOUNTER — LAB (OUTPATIENT)
Dept: LAB | Facility: HOSPITAL | Age: 76
End: 2021-08-04

## 2021-08-04 VITALS — SYSTOLIC BLOOD PRESSURE: 136 MMHG | DIASTOLIC BLOOD PRESSURE: 76 MMHG | HEART RATE: 57 BPM

## 2021-08-04 VITALS
OXYGEN SATURATION: 94 % | HEIGHT: 71 IN | HEART RATE: 64 BPM | TEMPERATURE: 97.1 F | WEIGHT: 264.8 LBS | BODY MASS INDEX: 37.07 KG/M2 | SYSTOLIC BLOOD PRESSURE: 133 MMHG | DIASTOLIC BLOOD PRESSURE: 75 MMHG | RESPIRATION RATE: 16 BRPM

## 2021-08-04 DIAGNOSIS — D75.1 ERYTHROCYTOSIS: Primary | ICD-10-CM

## 2021-08-04 PROBLEM — I21.9 MYOCARDIAL INFARCTION (HCC): Status: ACTIVE | Noted: 2018-09-22

## 2021-08-04 LAB
BASOPHILS # BLD AUTO: 0.15 10*3/MM3 (ref 0–0.2)
BASOPHILS NFR BLD AUTO: 1.6 % (ref 0–1.5)
DEPRECATED RDW RBC AUTO: 47.4 FL (ref 37–54)
EOSINOPHIL # BLD AUTO: 0.21 10*3/MM3 (ref 0–0.4)
EOSINOPHIL NFR BLD AUTO: 2.2 % (ref 0.3–6.2)
ERYTHROCYTE [DISTWIDTH] IN BLOOD BY AUTOMATED COUNT: 17.2 % (ref 12.3–15.4)
HCT VFR BLD AUTO: 51 % (ref 37.5–51)
HGB BLD-MCNC: 16.2 G/DL (ref 13–17.7)
IMM GRANULOCYTES # BLD AUTO: 0.07 10*3/MM3 (ref 0–0.05)
IMM GRANULOCYTES NFR BLD AUTO: 0.7 % (ref 0–0.5)
LYMPHOCYTES # BLD AUTO: 1.88 10*3/MM3 (ref 0.7–3.1)
LYMPHOCYTES NFR BLD AUTO: 19.7 % (ref 19.6–45.3)
MCH RBC QN AUTO: 26 PG (ref 26.6–33)
MCHC RBC AUTO-ENTMCNC: 31.8 G/DL (ref 31.5–35.7)
MCV RBC AUTO: 81.9 FL (ref 79–97)
MONOCYTES # BLD AUTO: 1.26 10*3/MM3 (ref 0.1–0.9)
MONOCYTES NFR BLD AUTO: 13.2 % (ref 5–12)
NEUTROPHILS NFR BLD AUTO: 5.98 10*3/MM3 (ref 1.7–7)
NEUTROPHILS NFR BLD AUTO: 62.6 % (ref 42.7–76)
NRBC BLD AUTO-RTO: 0 /100 WBC (ref 0–0.2)
PLATELET # BLD AUTO: 266 10*3/MM3 (ref 140–450)
PMV BLD AUTO: 9.4 FL (ref 6–12)
RBC # BLD AUTO: 6.23 10*6/MM3 (ref 4.14–5.8)
WBC # BLD AUTO: 9.55 10*3/MM3 (ref 3.4–10.8)

## 2021-08-04 PROCEDURE — 85025 COMPLETE CBC W/AUTO DIFF WBC: CPT

## 2021-08-04 PROCEDURE — 36415 COLL VENOUS BLD VENIPUNCTURE: CPT

## 2021-08-04 PROCEDURE — 99214 OFFICE O/P EST MOD 30 MIN: CPT | Performed by: INTERNAL MEDICINE

## 2021-08-04 PROCEDURE — 99195 PHLEBOTOMY: CPT

## 2021-08-04 RX ORDER — SODIUM CHLORIDE 9 MG/ML
250 INJECTION, SOLUTION INTRAVENOUS ONCE
Status: CANCELLED | OUTPATIENT
Start: 2021-08-11

## 2021-08-04 RX ORDER — SODIUM CHLORIDE 9 MG/ML
250 INJECTION, SOLUTION INTRAVENOUS ONCE
Status: DISCONTINUED | OUTPATIENT
Start: 2021-08-04 | End: 2021-08-04 | Stop reason: HOSPADM

## 2021-08-04 RX ORDER — CHLORAL HYDRATE 500 MG
2 CAPSULE ORAL
COMMUNITY
End: 2022-11-27

## 2021-08-04 RX ORDER — BETAMETHASONE DIPROPIONATE 0.5 MG/G
CREAM TOPICAL
COMMUNITY
Start: 2021-07-27 | End: 2022-11-27

## 2021-08-04 NOTE — PROGRESS NOTES
Subjective     REASON FOR FOLLOWUP:   1  Erythrocytosis left shifted oxygen dissociation curve?  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  Bilateral kidney cysts  3.  Persistent elevated carboxyhemoglobin with no obvious cause                             REQUESTING PHYSICIAN: Alessandra ANDUJAR    History of Present Illness Mr. Medel is a mary 75 y.o. male with the above-mentioned history here today for follow-up.  He is doing well.  His numbers have remained much more stable receiving phlebotomies every 2 months.  Hematocrit is 51% today we discussed phlebotomizing him with a goal to try to keep him under 48 if possible.    He has been vaccinated against Covid.    Of note, when patient was seen in September 2020, we did check a JACOB reticulin mutation analysis because of persistent erythrocytosis.  This was not detected.    We will reimage his kidneys because of multiple kidney cysts    He has gained 30 pounds over the last year and a half and is concerned that there was some fatty fullness in both axilla and he wants me to examine this and check it out    Past Medical History:   Diagnosis Date   • CAD (coronary artery disease)    • H/O Blood dyscrasia    • History of snoring    • Hyperlipidemia    • Hypertension    • Kidney stone    • Low hemoglobin    • Myocardial infarction (CMS/HCC) 09/22/2018    placed one stent   • Seasonal allergies    • Skin cancer     Left forearm    skin cancers for which he takes light treatments periodically    Past Surgical History:   Procedure Laterality Date   • CARDIAC CATHETERIZATION  2018   • CATARACT EXTRACTION     • COLONOSCOPY      x4   • CORONARY STENT PLACEMENT  2018   • ENDOSCOPY  1972   • HERNIA REPAIR  1995   • KIDNEY STONE SURGERY  2006   • SKIN CANCER EXCISION      Cheek-squamous cell and top of head-basal cell carcioma   • URETEROSCOPY Right    • VASECTOMY  1982        HEME HISTORY:  patient is a 70-year-old male fairly good health except for hypertension and  hypercholesterolemia and kidney stones was noted on recent blood work to have an elevated hemoglobin and hematocrit of 17/50% on 2 or 3 occasions.  Patient was referred to us for evaluation of this.  He has had some recent headaches and lightheadedness which she attributes to his blood pressure medicines but otherwise feels well.  His note night sweats weight loss or loss of appetite or pain anywhere.  He has been on the same blood pressure medicines for many years.  On questioning him further he reports that when he was in his early 20s he works as a  and his hemoglobin was high in the 16 g range even then and he thinks this may be a long term problem that he has had.  His 2 children and 3 sisters but does not know of anybody else has an elevated hemoglobin.  He is not a smoker does not have sleep apnea and has a carbon monoxide monitoring in his home.  He has had no recent CAT scans of the abdomen no hematuria or kidney disease.  He is not taking any testosterone injections or preparations.    71-year-old male with long-standing erythrocytosis returns today to review the results of his blood work and imaging.  We did a chest x-ray because he complained of a chronic cough and this is thankfully benign except for some mild hyperinflation/COPD which is unusual as he is a nonsmoker.  Suspect the lisinopril is causing his cough.  Ultrasound of kidneys showed bilateral large kidney cysts but no masses.  His erythropoietin level was low at 5 and his, carbon monoxide level was mildly elevated 2.4 and his P 50 was left shifted suggesting a possible-high affinity hemoglobin!  His tolerating phlebotomy well and he actually felt better after the phlebotomy and therefore we will continue for the time being although I did tell him that I would repeat his carbon monoxide level to make sure it is back down as he got a new furnace for his home and this may cause some changes.  And he could get his phlebotomies at the [facility] because he has no evidence of polycythemia vera/ bone marrow disorder at this point.        Current Outpatient Medications:   •  aspirin 81 MG disintegrating tablet, Take 81 mg by mouth., Disp: , Rfl:   •  atorvastatin (LIPITOR) 40 MG tablet, 40 mg., Disp: , Rfl:   •  betamethasone dipropionate 0.05 % cream, APPLY TOPICALLY TO THE AFFECTED AREA ON LEGS TWICE DAILY AS NEEDED FOR BUG BITES, Disp: , Rfl:   •  hydrocortisone 2.5 % cream, , Disp: , Rfl:   •  lisinopril (PRINIVIL,ZESTRIL) 10 MG tablet, , Disp: , Rfl:   •  metoprolol succinate XL (TOPROL-XL) 12.5 MG 24 hr half tablet, Take 25 mg by mouth Daily., Disp: , Rfl:   •  nitroglycerin (NITROSTAT) 0.4 MG SL tablet, Place 0.4 mg under the tongue Every 5 (Five) Minutes As Needed for Chest Pain. Take no more than 3 doses in 15 minutes., Disp: , Rfl:   •  omega-3 acid ethyl esters (LOVAZA) 1 G capsule, , Disp: , Rfl:   •  Omega-3 Fatty Acids (fish oil) 1000 MG capsule capsule, Take 2 g by mouth., Disp: , Rfl:   •  polyethylene glycol (MIRALAX) packet, Take 17 g by mouth Daily., Disp: , Rfl:   •  prednisoLONE acetate (PRED FORTE) 1 % ophthalmic suspension, prednisolone acetate 1 % eye drops,suspension, Disp: , Rfl:   •  Psyllium (METAMUCIL MULTIHEALTH FIBER PO), Metamucil MultiHealth Fiber, Disp: , Rfl:   •  Triamcinolone Acetonide (NASACORT ALLERGY 24HR) 55 MCG/ACT nasal inhaler, 2 sprays into the nostril(s) as directed by provider Daily., Disp: , Rfl:   No current facility-administered medications for this visit.    Facility-Administered Medications Ordered in Other Visits:   •  sodium chloride 0.9 % infusion 250 mL, 250 mL, Intravenous, Once, Yoav Andino MD    ALLERGIES:    Allergies   Allergen Reactions   • Erythromycin Unknown - Low Severity   • Sulfa Antibiotics Unknown - Low Severity   • Amoxicillin-Pot Clavulanate GI Intolerance     Stomach cramping--amoxicillin is OK          Social History     Socioeconomic History   • Marital status:   "    Spouse name: Radha   • Number of children: Not on file   • Years of education: High school   • Highest education level: Not on file   Tobacco Use   • Smoking status: Never Smoker   • Smokeless tobacco: Never Used   Substance and Sexual Activity   • Alcohol use: Yes     Comment: Occasionally   • Drug use: No   • Sexual activity: Defer     does not smoke or drink no risk factors for HIV worked in [employer] has no chemicals or toxic exposure     Family History   Problem Relation Age of Onset   • Heart disease Mother    • Hypertension Mother    • Cancer Son 43        Squamous cell CA in tonsils      30-year-old son had squamous cell carcinoma of the tonsil 7 years ago and is JESSIE with no other malignancy in the family -nobody has a high hemoglobin in the family     Review of Systems   Constitutional: Negative for activity change, appetite change, chills, fatigue and fever.   HENT: Negative for mouth sores, nosebleeds and trouble swallowing.    Respiratory: Negative for cough and shortness of breath.    Cardiovascular: Negative for chest pain and leg swelling.   Gastrointestinal: Negative for abdominal pain, constipation, diarrhea, nausea and vomiting.   Genitourinary: Negative for difficulty urinating.   Skin: Negative for rash.   Neurological: Negative for dizziness, weakness and numbness.   Hematological: Negative for adenopathy. Does not bruise/bleed easily.   Psychiatric/Behavioral: Negative for sleep disturbance.   All other systems reviewed and are negative.  I have reviewed and confirmed the accuracy of the ROS as documented  Yoav Andino MD      Objective     Vitals:    08/04/21 0754   BP: 133/75   Pulse: 64   Resp: 16   Temp: 97.1 °F (36.2 °C)   TempSrc: Skin   SpO2: 94%   Weight: 120 kg (264 lb 12.8 oz)   Height: 180 cm (70.87\")   PainSc: 0-No pain     Current Status 8/4/2021   ECOG score 0     PHYSICAL EXAM:  GENERAL:  Well-developed, well-nourished in no acute distress.   SKIN: "  Warm, dry without rashes, purpura or petechiae.   EYES:  Pupils equal, round and reactive to light.  EOMs intact.  Conjunctivae normal.  EARS:  Hearing intact.  NOSE:  Septum midline.  No excoriations or nasal discharge.  MOUTH:  Tongue is well-papillated; no stomatitis or ulcers.  Lips normal.  THROAT:  Oropharynx without lesions or exudates.  NECK:  Supple with good range of motion; no thyromegaly or masses, no JVD.  LYMPHATICS:  No cervical, supraclavicular, axillary or inguinal adenopathy.  There is fatty fullness in both axilla with no palpable masses  CHEST:  Lungs clear to auscultation. Good airflow.  CARDIAC:  Regular rate and rhythm without murmurs, rubs or gallops. Normal S1,S2.  ABDOMEN:  Soft, nontender with no hepatosplenomegaly or masses.  EXTREMITIES:  No clubbing, cyanosis or edema.  NEUROLOGICAL:  Cranial Nerves II-XII grossly intact.  No focal neurological deficits.  PSYCHIATRIC:  Normal affect and mood.      I have reexamined the patient and the results are consistent with the previously documented exam. Yoav Andino MD       RECENT LABS:  Results from last 7 days   Lab Units 08/04/21  0746   WBC 10*3/mm3 9.55   NEUTROS ABS 10*3/mm3 5.98   HEMOGLOBIN g/dL 16.2   HEMATOCRIT % 51.0   PLATELETS 10*3/mm3 266                     Assessment/Plan     1.  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    Previously receiving phlebotomies every 2 months and was tolerating well. Had a heart attack in 9/2018 with a hematocrit of 45%.     For a time we did move him to every 3-month phlebotomies however his hematocrit began running around 50%.  We moved him back to every 2 months in 2020 and this has been keeping his hematocrit closer to 48%.    Hematocrit is 48.7% today we will phlebotomize him with the goal to keep him under 48% if possible.  He is doing well  and essentially asymptomatic.  He does occasionally feel more sluggish if hematocrit gets higher.      PLAN:  1.  Hematocrit 51% today, proceed with phlebotomy.  2.  CBC q.  2 months possible phlebotomy  3.  Patient otherwise scheduled to follow-up with NP in 6 months and Dr. Andino in 12 months  4.  Repeat ultrasound of the kidneys  Patient encouraged to call with any questions or concerns prior to scheduled return.  He is tolerating phlebotomies well and his numbers remain more stable and every 2-month phlebotomy schedule.

## 2021-08-05 LAB — EPO SERPL-ACNC: 15.4 MIU/ML (ref 2.6–18.5)

## 2021-10-06 ENCOUNTER — HOSPITAL ENCOUNTER (OUTPATIENT)
Dept: ULTRASOUND IMAGING | Facility: HOSPITAL | Age: 76
Discharge: HOME | End: 2021-10-06
Admitting: INTERNAL MEDICINE

## 2021-10-06 ENCOUNTER — INFUSION (OUTPATIENT)
Dept: ONCOLOGY | Facility: HOSPITAL | Age: 76
End: 2021-10-06

## 2021-10-06 ENCOUNTER — LAB (OUTPATIENT)
Dept: LAB | Facility: HOSPITAL | Age: 76
End: 2021-10-06

## 2021-10-06 DIAGNOSIS — D75.1 ERYTHROCYTOSIS: ICD-10-CM

## 2021-10-06 LAB
BASOPHILS # BLD AUTO: 0.12 10*3/MM3 (ref 0–0.2)
BASOPHILS NFR BLD AUTO: 1 % (ref 0–1.5)
DEPRECATED RDW RBC AUTO: 47.2 FL (ref 37–54)
EOSINOPHIL # BLD AUTO: 0.09 10*3/MM3 (ref 0–0.4)
EOSINOPHIL NFR BLD AUTO: 0.7 % (ref 0.3–6.2)
ERYTHROCYTE [DISTWIDTH] IN BLOOD BY AUTOMATED COUNT: 16 % (ref 12.3–15.4)
HCT VFR BLD AUTO: 47.7 % (ref 37.5–51)
HGB BLD-MCNC: 15.5 G/DL (ref 13–17.7)
IMM GRANULOCYTES # BLD AUTO: 0.1 10*3/MM3 (ref 0–0.05)
IMM GRANULOCYTES NFR BLD AUTO: 0.8 % (ref 0–0.5)
LYMPHOCYTES # BLD AUTO: 1.91 10*3/MM3 (ref 0.7–3.1)
LYMPHOCYTES NFR BLD AUTO: 15.8 % (ref 19.6–45.3)
MCH RBC QN AUTO: 26.7 PG (ref 26.6–33)
MCHC RBC AUTO-ENTMCNC: 32.5 G/DL (ref 31.5–35.7)
MCV RBC AUTO: 82.2 FL (ref 79–97)
MONOCYTES # BLD AUTO: 1.3 10*3/MM3 (ref 0.1–0.9)
MONOCYTES NFR BLD AUTO: 10.8 % (ref 5–12)
NEUTROPHILS NFR BLD AUTO: 70.9 % (ref 42.7–76)
NEUTROPHILS NFR BLD AUTO: 8.55 10*3/MM3 (ref 1.7–7)
NRBC BLD AUTO-RTO: 0 /100 WBC (ref 0–0.2)
PLATELET # BLD AUTO: 281 10*3/MM3 (ref 140–450)
PMV BLD AUTO: 9.2 FL (ref 6–12)
RBC # BLD AUTO: 5.8 10*6/MM3 (ref 4.14–5.8)
WBC # BLD AUTO: 12.07 10*3/MM3 (ref 3.4–10.8)

## 2021-10-06 PROCEDURE — 85025 COMPLETE CBC W/AUTO DIFF WBC: CPT

## 2021-10-06 PROCEDURE — 76775 US EXAM ABDO BACK WALL LIM: CPT

## 2021-10-06 PROCEDURE — 36415 COLL VENOUS BLD VENIPUNCTURE: CPT

## 2021-10-06 NOTE — NURSING NOTE
4/5/2021        RE: Radha Loya  [address]  [place] [address]         Radha Loya was seen for recheck following L knee injury which was work related on 3-28-21.  She may return to work today.  I have recommended no squatting for 2 weeks. She may continue all other activities as tolerated by pain.       Sincerely,        Mariela Aguila PA-C        Phlebo for HCT > 48. Per treatment parameters, no phlebo today. Copy of labs given and pt v/u.    Lab Results   Component Value Date    WBC 12.07 (H) 10/06/2021    HGB 15.5 10/06/2021    HCT 47.7 10/06/2021    MCV 82.2 10/06/2021     10/06/2021

## 2021-12-08 ENCOUNTER — LAB (OUTPATIENT)
Dept: LAB | Facility: HOSPITAL | Age: 76
End: 2021-12-08

## 2021-12-08 ENCOUNTER — INFUSION (OUTPATIENT)
Dept: ONCOLOGY | Facility: HOSPITAL | Age: 76
End: 2021-12-08

## 2021-12-08 VITALS
RESPIRATION RATE: 18 BRPM | HEART RATE: 81 BPM | TEMPERATURE: 97 F | OXYGEN SATURATION: 94 % | DIASTOLIC BLOOD PRESSURE: 77 MMHG | SYSTOLIC BLOOD PRESSURE: 152 MMHG | BODY MASS INDEX: 36.54 KG/M2 | WEIGHT: 261 LBS

## 2021-12-08 DIAGNOSIS — D75.1 ERYTHROCYTOSIS: ICD-10-CM

## 2021-12-08 LAB
BASOPHILS # BLD AUTO: 0.12 10*3/MM3 (ref 0–0.2)
BASOPHILS NFR BLD AUTO: 1.3 % (ref 0–1.5)
DEPRECATED RDW RBC AUTO: 49.1 FL (ref 37–54)
EOSINOPHIL # BLD AUTO: 0.2 10*3/MM3 (ref 0–0.4)
EOSINOPHIL NFR BLD AUTO: 2.2 % (ref 0.3–6.2)
ERYTHROCYTE [DISTWIDTH] IN BLOOD BY AUTOMATED COUNT: 17.6 % (ref 12.3–15.4)
HCT VFR BLD AUTO: 50.6 % (ref 37.5–51)
HGB BLD-MCNC: 16.6 G/DL (ref 13–17.7)
IMM GRANULOCYTES # BLD AUTO: 0.11 10*3/MM3 (ref 0–0.05)
IMM GRANULOCYTES NFR BLD AUTO: 1.2 % (ref 0–0.5)
LYMPHOCYTES # BLD AUTO: 1.85 10*3/MM3 (ref 0.7–3.1)
LYMPHOCYTES NFR BLD AUTO: 20.3 % (ref 19.6–45.3)
MCH RBC QN AUTO: 27.2 PG (ref 26.6–33)
MCHC RBC AUTO-ENTMCNC: 32.8 G/DL (ref 31.5–35.7)
MCV RBC AUTO: 82.8 FL (ref 79–97)
MONOCYTES # BLD AUTO: 1.25 10*3/MM3 (ref 0.1–0.9)
MONOCYTES NFR BLD AUTO: 13.7 % (ref 5–12)
NEUTROPHILS NFR BLD AUTO: 5.59 10*3/MM3 (ref 1.7–7)
NEUTROPHILS NFR BLD AUTO: 61.3 % (ref 42.7–76)
NRBC BLD AUTO-RTO: 0 /100 WBC (ref 0–0.2)
PLATELET # BLD AUTO: 264 10*3/MM3 (ref 140–450)
PMV BLD AUTO: 9.1 FL (ref 6–12)
RBC # BLD AUTO: 6.11 10*6/MM3 (ref 4.14–5.8)
WBC NRBC COR # BLD: 9.12 10*3/MM3 (ref 3.4–10.8)

## 2021-12-08 PROCEDURE — 36415 COLL VENOUS BLD VENIPUNCTURE: CPT

## 2021-12-08 PROCEDURE — 85025 COMPLETE CBC W/AUTO DIFF WBC: CPT

## 2021-12-08 PROCEDURE — 99195 PHLEBOTOMY: CPT

## 2022-01-25 ENCOUNTER — TELEPHONE (OUTPATIENT)
Dept: ONCOLOGY | Facility: CLINIC | Age: 77
End: 2022-01-25

## 2022-02-02 ENCOUNTER — OFFICE VISIT (OUTPATIENT)
Dept: ONCOLOGY | Facility: CLINIC | Age: 77
End: 2022-02-02

## 2022-02-02 ENCOUNTER — LAB (OUTPATIENT)
Dept: LAB | Facility: HOSPITAL | Age: 77
End: 2022-02-02

## 2022-02-02 ENCOUNTER — INFUSION (OUTPATIENT)
Dept: ONCOLOGY | Facility: HOSPITAL | Age: 77
End: 2022-02-02

## 2022-02-02 VITALS
HEART RATE: 88 BPM | BODY MASS INDEX: 36.88 KG/M2 | OXYGEN SATURATION: 95 % | WEIGHT: 263.4 LBS | HEIGHT: 71 IN | DIASTOLIC BLOOD PRESSURE: 76 MMHG | RESPIRATION RATE: 18 BRPM | SYSTOLIC BLOOD PRESSURE: 146 MMHG | TEMPERATURE: 97.8 F

## 2022-02-02 DIAGNOSIS — D75.1 ERYTHROCYTOSIS: Primary | ICD-10-CM

## 2022-02-02 DIAGNOSIS — N28.1 BILATERAL RENAL CYSTS: ICD-10-CM

## 2022-02-02 DIAGNOSIS — D75.1 ERYTHROCYTOSIS: ICD-10-CM

## 2022-02-02 LAB
ALBUMIN SERPL-MCNC: 4.2 G/DL (ref 3.5–5.2)
ALBUMIN/GLOB SERPL: 1.6 G/DL (ref 1.1–2.4)
ALP SERPL-CCNC: 86 U/L (ref 38–116)
ALT SERPL W P-5'-P-CCNC: 19 U/L (ref 0–41)
ANION GAP SERPL CALCULATED.3IONS-SCNC: 13.1 MMOL/L (ref 5–15)
AST SERPL-CCNC: 19 U/L (ref 0–40)
BASOPHILS # BLD AUTO: 0.09 10*3/MM3 (ref 0–0.2)
BASOPHILS NFR BLD AUTO: 1.1 % (ref 0–1.5)
BILIRUB SERPL-MCNC: 0.8 MG/DL (ref 0.2–1.2)
BUN SERPL-MCNC: 20 MG/DL (ref 6–20)
BUN/CREAT SERPL: 21.5 (ref 7.3–30)
CALCIUM SPEC-SCNC: 9.3 MG/DL (ref 8.5–10.2)
CHLORIDE SERPL-SCNC: 104 MMOL/L (ref 98–107)
CO2 SERPL-SCNC: 25.9 MMOL/L (ref 22–29)
CREAT SERPL-MCNC: 0.93 MG/DL (ref 0.7–1.3)
DEPRECATED RDW RBC AUTO: 47 FL (ref 37–54)
EOSINOPHIL # BLD AUTO: 0.14 10*3/MM3 (ref 0–0.4)
EOSINOPHIL NFR BLD AUTO: 1.7 % (ref 0.3–6.2)
ERYTHROCYTE [DISTWIDTH] IN BLOOD BY AUTOMATED COUNT: 15 % (ref 12.3–15.4)
GFR SERPL CREATININE-BSD FRML MDRD: 79 ML/MIN/1.73
GLOBULIN UR ELPH-MCNC: 2.6 GM/DL (ref 1.8–3.5)
GLUCOSE SERPL-MCNC: 106 MG/DL (ref 74–124)
HCT VFR BLD AUTO: 50.5 % (ref 37.5–51)
HGB BLD-MCNC: 16.1 G/DL (ref 13–17.7)
IMM GRANULOCYTES # BLD AUTO: 0.06 10*3/MM3 (ref 0–0.05)
IMM GRANULOCYTES NFR BLD AUTO: 0.7 % (ref 0–0.5)
LYMPHOCYTES # BLD AUTO: 1.63 10*3/MM3 (ref 0.7–3.1)
LYMPHOCYTES NFR BLD AUTO: 19.4 % (ref 19.6–45.3)
MCH RBC QN AUTO: 27.2 PG (ref 26.6–33)
MCHC RBC AUTO-ENTMCNC: 31.9 G/DL (ref 31.5–35.7)
MCV RBC AUTO: 85.3 FL (ref 79–97)
MONOCYTES # BLD AUTO: 1.09 10*3/MM3 (ref 0.1–0.9)
MONOCYTES NFR BLD AUTO: 13 % (ref 5–12)
NEUTROPHILS NFR BLD AUTO: 5.39 10*3/MM3 (ref 1.7–7)
NEUTROPHILS NFR BLD AUTO: 64.1 % (ref 42.7–76)
NRBC BLD AUTO-RTO: 0 /100 WBC (ref 0–0.2)
PLATELET # BLD AUTO: 276 10*3/MM3 (ref 140–450)
PMV BLD AUTO: 9 FL (ref 6–12)
POTASSIUM SERPL-SCNC: 4.5 MMOL/L (ref 3.5–4.7)
PROT SERPL-MCNC: 6.8 G/DL (ref 6.3–8)
RBC # BLD AUTO: 5.92 10*6/MM3 (ref 4.14–5.8)
SODIUM SERPL-SCNC: 143 MMOL/L (ref 134–145)
WBC NRBC COR # BLD: 8.4 10*3/MM3 (ref 3.4–10.8)

## 2022-02-02 PROCEDURE — 85025 COMPLETE CBC W/AUTO DIFF WBC: CPT

## 2022-02-02 PROCEDURE — 80053 COMPREHEN METABOLIC PANEL: CPT

## 2022-02-02 PROCEDURE — 99195 PHLEBOTOMY: CPT | Performed by: NURSE PRACTITIONER

## 2022-02-02 PROCEDURE — 99213 OFFICE O/P EST LOW 20 MIN: CPT | Performed by: NURSE PRACTITIONER

## 2022-02-02 PROCEDURE — 99195 PHLEBOTOMY: CPT

## 2022-02-02 PROCEDURE — 36415 COLL VENOUS BLD VENIPUNCTURE: CPT

## 2022-02-02 RX ORDER — LISINOPRIL 10 MG/1
10 TABLET ORAL DAILY
COMMUNITY

## 2022-02-02 RX ORDER — SODIUM CHLORIDE 9 MG/ML
250 INJECTION, SOLUTION INTRAVENOUS ONCE
Status: CANCELLED | OUTPATIENT
Start: 2022-02-09

## 2022-02-02 NOTE — PROGRESS NOTES
Subjective     REASON FOR FOLLOWUP:   1  Erythrocytosis left shifted oxygen dissociation curve?  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  Bilateral kidney cysts  3.  Persistent elevated carboxyhemoglobin with no obvious cause                             REQUESTING PHYSICIAN: Alessandra ANDUJAR    History of Present Illness Mr. Medel is a mary 76 y.o. male with the above-mentioned history here today for follow-up.  He is doing well.  His numbers have remained much more stable receiving phlebotomies every 2 months.  Hematocrit is 50% today we discussed phlebotomizing him with a goal to try to keep him under 48 if possible.    He continues to tolerate his phlebotomies well.  He is not symptomatic of his erythrocytosis.  He denies headaches or blurred vision.  He reports normal urine output.  He denies fevers or chills.    He did have a follow-up renal ultrasound 10/6/2021 which continued to document bilateral renal cysts which were unchanged when compared to 2016.  He is not established with a nephrologist.    Past Medical History:   Diagnosis Date   • CAD (coronary artery disease)    • H/O Blood dyscrasia    • History of snoring    • Hyperlipidemia    • Hypertension    • Kidney stone    • Low hemoglobin    • Myocardial infarction (HCC) 09/22/2018    placed one stent   • Seasonal allergies    • Skin cancer     Left forearm    skin cancers for which he takes light treatments periodically    Past Surgical History:   Procedure Laterality Date   • CARDIAC CATHETERIZATION  2018   • CATARACT EXTRACTION     • COLONOSCOPY      x4   • CORONARY STENT PLACEMENT  2018   • ENDOSCOPY  1972   • HERNIA REPAIR  1995   • KIDNEY STONE SURGERY  2006   • SKIN CANCER EXCISION      Cheek-squamous cell and top of head-basal cell carcioma   • URETEROSCOPY Right    • VASECTOMY  1982        HEME HISTORY:  patient is a 70-year-old male fairly good health except for hypertension and hypercholesterolemia and kidney stones was noted on recent  blood work to have an elevated hemoglobin and hematocrit of 17/50% on 2 or 3 occasions.  Patient was referred to us for evaluation of this.  He has had some recent headaches and lightheadedness which she attributes to his blood pressure medicines but otherwise feels well.  His note night sweats weight loss or loss of appetite or pain anywhere.  He has been on the same blood pressure medicines for many years.  On questioning him further he reports that when he was in his early 20s he works as a  and his hemoglobin was high in the 16 g range even then and he thinks this may be a long term problem that he has had.  His 2 children and 3 sisters but does not know of anybody else has an elevated hemoglobin.  He is not a smoker does not have sleep apnea and has a carbon monoxide monitoring in his home.  He has had no recent CAT scans of the abdomen no hematuria or kidney disease.  He is not taking any testosterone injections or preparations.    71-year-old male with long-standing erythrocytosis returns today to review the results of his blood work and imaging.  We did a chest x-ray because he complained of a chronic cough and this is thankfully benign except for some mild hyperinflation/COPD which is unusual as he is a nonsmoker.  Suspect the lisinopril is causing his cough.  Ultrasound of kidneys showed bilateral large kidney cysts but no masses.  His erythropoietin level was low at 5 and his, carbon monoxide level was mildly elevated 2.4 and his P 50 was left shifted suggesting a possible-high affinity hemoglobin!  His tolerating phlebotomy well and he actually felt better after the phlebotomy and therefore we will continue for the time being although I did tell him that I would repeat his carbon monoxide level to make sure it is back down as he got a new furnace for his home and this may cause some changes.  And he could get his phlebotomies at the [facility] because he has no evidence of polycythemia vera/ bone  marrow disorder at this point.        Current Outpatient Medications:   •  aspirin 81 MG disintegrating tablet, Take 81 mg by mouth., Disp: , Rfl:   •  atorvastatin (LIPITOR) 40 MG tablet, 40 mg., Disp: , Rfl:   •  betamethasone dipropionate 0.05 % cream, APPLY TOPICALLY TO THE AFFECTED AREA ON LEGS TWICE DAILY AS NEEDED FOR BUG BITES, Disp: , Rfl:   •  hydrocortisone 2.5 % cream, , Disp: , Rfl:   •  lisinopril (PRINIVIL,ZESTRIL) 10 MG tablet, , Disp: , Rfl:   •  lisinopril (PRINIVIL,ZESTRIL) 10 MG tablet, Take 10 mg by mouth Daily., Disp: , Rfl:   •  metoprolol succinate XL (TOPROL-XL) 12.5 MG 24 hr half tablet, Take 25 mg by mouth Daily., Disp: , Rfl:   •  nitroglycerin (NITROSTAT) 0.4 MG SL tablet, Place 0.4 mg under the tongue Every 5 (Five) Minutes As Needed for Chest Pain. Take no more than 3 doses in 15 minutes., Disp: , Rfl:   •  omega-3 acid ethyl esters (LOVAZA) 1 G capsule, , Disp: , Rfl:   •  Omega-3 Fatty Acids (fish oil) 1000 MG capsule capsule, Take 2 g by mouth., Disp: , Rfl:   •  polyethylene glycol (MIRALAX) packet, Take 17 g by mouth Daily., Disp: , Rfl:   •  prednisoLONE acetate (PRED FORTE) 1 % ophthalmic suspension, prednisolone acetate 1 % eye drops,suspension, Disp: , Rfl:   •  Psyllium (METAMUCIL MULTIHEALTH FIBER PO), Metamucil [employer] Fiber, Disp: , Rfl:   •  Triamcinolone Acetonide (NASACORT ALLERGY 24HR) 55 MCG/ACT nasal inhaler, 2 sprays into the nostril(s) as directed by provider Daily., Disp: , Rfl:     ALLERGIES:    Allergies   Allergen Reactions   • Erythromycin Unknown - Low Severity   • Sulfa Antibiotics Unknown - Low Severity   • Amoxicillin-Pot Clavulanate GI Intolerance     Stomach cramping--amoxicillin is OK          Social History     Socioeconomic History   • Marital status:      Spouse name: Radha   • Years of education: High school   Tobacco Use   • Smoking status: Never Smoker   • Smokeless tobacco: Never Used   Substance and Sexual Activity   • Alcohol use:  "Yes     Comment: Occasionally   • Drug use: No   • Sexual activity: Defer     does not smoke or drink no risk factors for HIV worked in [employer] has no chemicals or toxic exposure     Family History   Problem Relation Age of Onset   • Heart disease Mother    • Hypertension Mother    • Cancer Son 43        Squamous cell CA in tonsils      30-year-old son had squamous cell carcinoma of the tonsil 7 years ago and is JESSIE with no other malignancy in the family -nobody has a high hemoglobin in the family     Review of Systems   As per HPI    I have reviewed and confirmed the accuracy of the ROS as documented  LEONEL Diehl      Objective     Vitals:    02/02/22 0823   BP: 146/76   Pulse: 88   Resp: 18   Temp: 97.8 °F (36.6 °C)   TempSrc: Temporal   SpO2: 95%   Weight: 119 kg (263 lb 6.4 oz)   Height: 180 cm (70.87\")   PainSc: 0-No pain     Current Status 2/2/2022   ECOG score 0     PHYSICAL EXAM:  GENERAL:  Well-developed, well-nourished in no acute distress.   SKIN:  Warm, dry without rashes, purpura or petechiae.   EYES:  Pupils equal, round and reactive to light.  EOMs intact.  Conjunctivae normal.  EARS:  Hearing intact.  NOSE:  Septum midline.  No excoriations or nasal discharge.  CHEST:  Lungs clear to auscultation. Good airflow.  CARDIAC:  Regular rate and rhythm without murmurs, rubs or gallops. Normal S1,S2.  ABDOMEN:  Soft, nontender with no hepatosplenomegaly or masses.  EXTREMITIES:  No clubbing, cyanosis or edema.  NEUROLOGICAL:  Cranial Nerves II-XII grossly intact.  No focal neurological deficits.  PSYCHIATRIC:  Normal affect and mood.      I have reexamined the patient and the results are consistent with the previously documented exam. Corina Lewis, LEONEL       RECENT LABS:  Results from last 7 days   Lab Units 02/02/22  0814   WBC 10*3/mm3 8.40   NEUTROS ABS 10*3/mm3 5.39   HEMOGLOBIN g/dL 16.1   HEMATOCRIT % 50.5   PLATELETS 10*3/mm3 276     Results from last 7 " days   Lab Units 02/02/22  0814   SODIUM mmol/L 143   POTASSIUM mmol/L 4.5   CHLORIDE mmol/L 104   CO2 mmol/L 25.9   BUN mg/dL 20   CREATININE mg/dL 0.93   CALCIUM mg/dL 9.3   ALBUMIN g/dL 4.20   BILIRUBIN mg/dL 0.8   ALK PHOS U/L 86   ALT (SGPT) U/L 19   AST (SGOT) U/L 19   GLUCOSE mg/dL 106         US Renal Bilateral (10/06/2021 09:25)      Assessment/Plan     1.  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    Previously receiving phlebotomies every 2 months and was tolerating well. Had a heart attack in 9/2018 with a hematocrit of 45%.     For a time we did move him to every 3-month phlebotomies however his hematocrit began running around 50%.  We moved him back to every 2 months in 2020 and this has been keeping his hematocrit closer to 48%.    Stone today of 50.5%.  We will proceed with phlebotomy with goal to maintain hematocrit less than 48%.  He is essentially asymptomatic of his erythrocytosis    2.  Bilateral renal cysts.  Original ultrasound in 2016 with multiple bilateral renal cyst.  Repeat ultrasound 10/6/2021 which was completely unchanged.      PLAN:  1. Proceed today with phlebotomy for hematocrit 50.5%  2. Goal to maintain phlebotomy less than 48%  3. Return every 2 months for CBC and possible phlebotomy  4. Renal ultrasound reviewed with the patient today which was unchanged when compared to 2016  5. MD follow-up in 6 months with Dr. Sina Lewis, APRN  02/02/2022

## 2022-04-06 ENCOUNTER — LAB (OUTPATIENT)
Dept: LAB | Facility: HOSPITAL | Age: 77
End: 2022-04-06

## 2022-04-06 ENCOUNTER — INFUSION (OUTPATIENT)
Dept: ONCOLOGY | Facility: HOSPITAL | Age: 77
End: 2022-04-06

## 2022-04-06 VITALS
HEART RATE: 82 BPM | OXYGEN SATURATION: 94 % | RESPIRATION RATE: 16 BRPM | SYSTOLIC BLOOD PRESSURE: 152 MMHG | TEMPERATURE: 97.8 F | DIASTOLIC BLOOD PRESSURE: 71 MMHG | BODY MASS INDEX: 36.09 KG/M2 | WEIGHT: 257.8 LBS

## 2022-04-06 DIAGNOSIS — D75.1 ERYTHROCYTOSIS: Primary | ICD-10-CM

## 2022-04-06 LAB
BASOPHILS # BLD AUTO: 0.13 10*3/MM3 (ref 0–0.2)
BASOPHILS NFR BLD AUTO: 1.4 % (ref 0–1.5)
DEPRECATED RDW RBC AUTO: 47.5 FL (ref 37–54)
EOSINOPHIL # BLD AUTO: 0.21 10*3/MM3 (ref 0–0.4)
EOSINOPHIL NFR BLD AUTO: 2.3 % (ref 0.3–6.2)
ERYTHROCYTE [DISTWIDTH] IN BLOOD BY AUTOMATED COUNT: 17.4 % (ref 12.3–15.4)
HCT VFR BLD AUTO: 49.9 % (ref 37.5–51)
HGB BLD-MCNC: 16.4 G/DL (ref 13–17.7)
IMM GRANULOCYTES # BLD AUTO: 0.16 10*3/MM3 (ref 0–0.05)
IMM GRANULOCYTES NFR BLD AUTO: 1.7 % (ref 0–0.5)
LYMPHOCYTES # BLD AUTO: 2.03 10*3/MM3 (ref 0.7–3.1)
LYMPHOCYTES NFR BLD AUTO: 21.8 % (ref 19.6–45.3)
MCH RBC QN AUTO: 27.2 PG (ref 26.6–33)
MCHC RBC AUTO-ENTMCNC: 32.9 G/DL (ref 31.5–35.7)
MCV RBC AUTO: 82.8 FL (ref 79–97)
MONOCYTES # BLD AUTO: 1.16 10*3/MM3 (ref 0.1–0.9)
MONOCYTES NFR BLD AUTO: 12.4 % (ref 5–12)
NEUTROPHILS NFR BLD AUTO: 5.64 10*3/MM3 (ref 1.7–7)
NEUTROPHILS NFR BLD AUTO: 60.4 % (ref 42.7–76)
NRBC BLD AUTO-RTO: 0 /100 WBC (ref 0–0.2)
PLATELET # BLD AUTO: 302 10*3/MM3 (ref 140–450)
PMV BLD AUTO: 9.7 FL (ref 6–12)
RBC # BLD AUTO: 6.03 10*6/MM3 (ref 4.14–5.8)
WBC NRBC COR # BLD: 9.33 10*3/MM3 (ref 3.4–10.8)

## 2022-04-06 PROCEDURE — 99195 PHLEBOTOMY: CPT

## 2022-04-06 PROCEDURE — 85025 COMPLETE CBC W/AUTO DIFF WBC: CPT

## 2022-04-06 PROCEDURE — 36415 COLL VENOUS BLD VENIPUNCTURE: CPT

## 2022-04-06 RX ORDER — SODIUM CHLORIDE 9 MG/ML
250 INJECTION, SOLUTION INTRAVENOUS ONCE
Status: CANCELLED | OUTPATIENT
Start: 2022-04-13

## 2022-06-03 RX ORDER — SODIUM CHLORIDE 9 MG/ML
250 INJECTION, SOLUTION INTRAVENOUS ONCE
Status: CANCELLED | OUTPATIENT
Start: 2022-06-08

## 2022-06-08 ENCOUNTER — INFUSION (OUTPATIENT)
Dept: ONCOLOGY | Facility: HOSPITAL | Age: 77
End: 2022-06-08

## 2022-06-08 ENCOUNTER — LAB (OUTPATIENT)
Dept: LAB | Facility: HOSPITAL | Age: 77
End: 2022-06-08

## 2022-06-08 VITALS
RESPIRATION RATE: 16 BRPM | SYSTOLIC BLOOD PRESSURE: 151 MMHG | OXYGEN SATURATION: 95 % | WEIGHT: 250.6 LBS | HEART RATE: 92 BPM | DIASTOLIC BLOOD PRESSURE: 72 MMHG | BODY MASS INDEX: 35.08 KG/M2 | TEMPERATURE: 98.6 F

## 2022-06-08 DIAGNOSIS — D75.1 ERYTHROCYTOSIS: Primary | ICD-10-CM

## 2022-06-08 LAB
BASOPHILS # BLD AUTO: 0.09 10*3/MM3 (ref 0–0.2)
BASOPHILS NFR BLD AUTO: 0.8 % (ref 0–1.5)
DEPRECATED RDW RBC AUTO: 49.7 FL (ref 37–54)
EOSINOPHIL # BLD AUTO: 0.09 10*3/MM3 (ref 0–0.4)
EOSINOPHIL NFR BLD AUTO: 0.8 % (ref 0.3–6.2)
ERYTHROCYTE [DISTWIDTH] IN BLOOD BY AUTOMATED COUNT: 16.4 % (ref 12.3–15.4)
HCT VFR BLD AUTO: 49.3 % (ref 37.5–51)
HGB BLD-MCNC: 15.8 G/DL (ref 13–17.7)
IMM GRANULOCYTES # BLD AUTO: 0.09 10*3/MM3 (ref 0–0.05)
IMM GRANULOCYTES NFR BLD AUTO: 0.8 % (ref 0–0.5)
LYMPHOCYTES # BLD AUTO: 1.66 10*3/MM3 (ref 0.7–3.1)
LYMPHOCYTES NFR BLD AUTO: 14.5 % (ref 19.6–45.3)
MCH RBC QN AUTO: 27.1 PG (ref 26.6–33)
MCHC RBC AUTO-ENTMCNC: 32 G/DL (ref 31.5–35.7)
MCV RBC AUTO: 84.4 FL (ref 79–97)
MONOCYTES # BLD AUTO: 0.96 10*3/MM3 (ref 0.1–0.9)
MONOCYTES NFR BLD AUTO: 8.4 % (ref 5–12)
NEUTROPHILS NFR BLD AUTO: 74.7 % (ref 42.7–76)
NEUTROPHILS NFR BLD AUTO: 8.52 10*3/MM3 (ref 1.7–7)
NRBC BLD AUTO-RTO: 0 /100 WBC (ref 0–0.2)
PLATELET # BLD AUTO: 272 10*3/MM3 (ref 140–450)
PMV BLD AUTO: 8.9 FL (ref 6–12)
RBC # BLD AUTO: 5.84 10*6/MM3 (ref 4.14–5.8)
WBC NRBC COR # BLD: 11.41 10*3/MM3 (ref 3.4–10.8)

## 2022-06-08 PROCEDURE — 99195 PHLEBOTOMY: CPT

## 2022-06-08 PROCEDURE — 85025 COMPLETE CBC W/AUTO DIFF WBC: CPT

## 2022-06-08 PROCEDURE — 36415 COLL VENOUS BLD VENIPUNCTURE: CPT

## 2022-06-08 RX ORDER — SODIUM CHLORIDE 9 MG/ML
250 INJECTION, SOLUTION INTRAVENOUS ONCE
Status: CANCELLED | OUTPATIENT
Start: 2022-06-15

## 2022-08-01 ENCOUNTER — TELEPHONE (OUTPATIENT)
Dept: ONCOLOGY | Facility: CLINIC | Age: 77
End: 2022-08-01

## 2022-08-01 NOTE — TELEPHONE ENCOUNTER
----- Message from Janneth Escobar RN sent at 8/1/2022  5:12 PM EDT -----  Regarding: move to APRN  Dr. Andino is overbooked on Wednesday. Please move this pt to APRN schedule per her request. Thanks!

## 2022-08-03 ENCOUNTER — OFFICE VISIT (OUTPATIENT)
Dept: ONCOLOGY | Facility: CLINIC | Age: 77
End: 2022-08-03

## 2022-08-03 ENCOUNTER — LAB (OUTPATIENT)
Dept: LAB | Facility: HOSPITAL | Age: 77
End: 2022-08-03

## 2022-08-03 ENCOUNTER — INFUSION (OUTPATIENT)
Dept: ONCOLOGY | Facility: HOSPITAL | Age: 77
End: 2022-08-03

## 2022-08-03 VITALS
HEART RATE: 77 BPM | HEIGHT: 71 IN | WEIGHT: 254.6 LBS | OXYGEN SATURATION: 93 % | DIASTOLIC BLOOD PRESSURE: 83 MMHG | TEMPERATURE: 98.7 F | SYSTOLIC BLOOD PRESSURE: 146 MMHG | BODY MASS INDEX: 35.64 KG/M2 | RESPIRATION RATE: 16 BRPM

## 2022-08-03 DIAGNOSIS — D75.1 ERYTHROCYTOSIS: Primary | ICD-10-CM

## 2022-08-03 DIAGNOSIS — D75.1 ERYTHROCYTOSIS: ICD-10-CM

## 2022-08-03 LAB
BASOPHILS # BLD AUTO: 0.11 10*3/MM3 (ref 0–0.2)
BASOPHILS NFR BLD AUTO: 1 % (ref 0–1.5)
DEPRECATED RDW RBC AUTO: 47.2 FL (ref 37–54)
EOSINOPHIL # BLD AUTO: 0.16 10*3/MM3 (ref 0–0.4)
EOSINOPHIL NFR BLD AUTO: 1.5 % (ref 0.3–6.2)
ERYTHROCYTE [DISTWIDTH] IN BLOOD BY AUTOMATED COUNT: 15.7 % (ref 12.3–15.4)
HCT VFR BLD AUTO: 49 % (ref 37.5–51)
HGB BLD-MCNC: 15.9 G/DL (ref 13–17.7)
IMM GRANULOCYTES # BLD AUTO: 0.15 10*3/MM3 (ref 0–0.05)
IMM GRANULOCYTES NFR BLD AUTO: 1.4 % (ref 0–0.5)
LYMPHOCYTES # BLD AUTO: 1.89 10*3/MM3 (ref 0.7–3.1)
LYMPHOCYTES NFR BLD AUTO: 17.9 % (ref 19.6–45.3)
MCH RBC QN AUTO: 27.1 PG (ref 26.6–33)
MCHC RBC AUTO-ENTMCNC: 32.4 G/DL (ref 31.5–35.7)
MCV RBC AUTO: 83.6 FL (ref 79–97)
MONOCYTES # BLD AUTO: 1.04 10*3/MM3 (ref 0.1–0.9)
MONOCYTES NFR BLD AUTO: 9.9 % (ref 5–12)
NEUTROPHILS NFR BLD AUTO: 68.3 % (ref 42.7–76)
NEUTROPHILS NFR BLD AUTO: 7.2 10*3/MM3 (ref 1.7–7)
NRBC BLD AUTO-RTO: 0 /100 WBC (ref 0–0.2)
PLATELET # BLD AUTO: 289 10*3/MM3 (ref 140–450)
PMV BLD AUTO: 9.3 FL (ref 6–12)
RBC # BLD AUTO: 5.86 10*6/MM3 (ref 4.14–5.8)
WBC NRBC COR # BLD: 10.55 10*3/MM3 (ref 3.4–10.8)

## 2022-08-03 PROCEDURE — 99195 PHLEBOTOMY: CPT

## 2022-08-03 PROCEDURE — 36415 COLL VENOUS BLD VENIPUNCTURE: CPT

## 2022-08-03 PROCEDURE — 99213 OFFICE O/P EST LOW 20 MIN: CPT | Performed by: NURSE PRACTITIONER

## 2022-08-03 PROCEDURE — 85025 COMPLETE CBC W/AUTO DIFF WBC: CPT

## 2022-08-03 NOTE — PROGRESS NOTES
Subjective     REASON FOR FOLLOWUP:   1  Erythrocytosis left shifted oxygen dissociation curve?  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  Bilateral kidney cysts  3.  Persistent elevated carboxyhemoglobin with no obvious cause                             REQUESTING PHYSICIAN: Alessandra ANDUJAR    History of Present Illness Mr. Medel is a mary 76 y.o.  male with the above-mentioned history who is here today for lab review and possible phlebotomy.  Overall he tolerates the phlebotomies quite well.  He reports that he is asymptomatic prior to having the phlebotomies, although he does feel improved wellbeing about a day or 2 after the phlebotomy.  He typically does not require post phlebotomy hydration.  He has no new problems or concerns today otherwise.     Past Medical History:   Diagnosis Date   • CAD (coronary artery disease)    • H/O Blood dyscrasia    • History of snoring    • Hyperlipidemia    • Hypertension    • Kidney stone    • Low hemoglobin    • Myocardial infarction (HCC) 09/22/2018    placed one stent   • Seasonal allergies    • Skin cancer     Left forearm    skin cancers for which he takes light treatments periodically    Past Surgical History:   Procedure Laterality Date   • CARDIAC CATHETERIZATION  2018   • CATARACT EXTRACTION     • COLONOSCOPY      x4   • CORONARY STENT PLACEMENT  2018   • ENDOSCOPY  1972   • HERNIA REPAIR  1995   • KIDNEY STONE SURGERY  2006   • SKIN CANCER EXCISION      Cheek-squamous cell and top of head-basal cell carcioma   • URETEROSCOPY Right    • VASECTOMY  1982        HEME HISTORY:  patient is a 70-year-old male fairly good health except for hypertension and hypercholesterolemia and kidney stones was noted on recent blood work to have an elevated hemoglobin and hematocrit of 17/50% on 2 or 3 occasions.  Patient was referred to us for evaluation of this.  He has had some recent headaches and lightheadedness which she attributes to his blood pressure medicines but otherwise  feels well.  His note night sweats weight loss or loss of appetite or pain anywhere.  He has been on the same blood pressure medicines for many years.  On questioning him further he reports that when he was in his early 20s he works as a  and his hemoglobin was high in the 16 g range even then and he thinks this may be a long term problem that he has had.  His 2 children and 3 sisters but does not know of anybody else has an elevated hemoglobin.  He is not a smoker does not have sleep apnea and has a carbon monoxide monitoring in his home.  He has had no recent CAT scans of the abdomen no hematuria or kidney disease.  He is not taking any testosterone injections or preparations.    71-year-old male with long-standing erythrocytosis returns today to review the results of his blood work and imaging.  We did a chest x-ray because he complained of a chronic cough and this is thankfully benign except for some mild hyperinflation/COPD which is unusual as he is a nonsmoker.  Suspect the lisinopril is causing his cough.  Ultrasound of kidneys showed bilateral large kidney cysts but no masses.  His erythropoietin level was low at 5 and his, carbon monoxide level was mildly elevated 2.4 and his P 50 was left shifted suggesting a possible-high affinity hemoglobin!  His tolerating phlebotomy well and he actually felt better after the phlebotomy and therefore we will continue for the time being although I did tell him that I would repeat his carbon monoxide level to make sure it is back down as he got a new furnace for his home and this may cause some changes.  And he could get his phlebotomies at the [facility] because he has no evidence of polycythemia vera/ bone marrow disorder at this point.        Current Outpatient Medications:   •  aspirin 81 MG disintegrating tablet, Take 81 mg by mouth., Disp: , Rfl:   •  atorvastatin (LIPITOR) 40 MG tablet, 40 mg., Disp: , Rfl:   •  betamethasone dipropionate 0.05 % cream, APPLY  TOPICALLY TO THE AFFECTED AREA ON LEGS TWICE DAILY AS NEEDED FOR BUG BITES, Disp: , Rfl:   •  hydrocortisone 2.5 % cream, , Disp: , Rfl:   •  lisinopril (PRINIVIL,ZESTRIL) 10 MG tablet, , Disp: , Rfl:   •  lisinopril (PRINIVIL,ZESTRIL) 10 MG tablet, Take 10 mg by mouth Daily., Disp: , Rfl:   •  metoprolol succinate XL (TOPROL-XL) 12.5 MG 24 hr half tablet, Take 25 mg by mouth Daily., Disp: , Rfl:   •  nitroglycerin (NITROSTAT) 0.4 MG SL tablet, Place 0.4 mg under the tongue Every 5 (Five) Minutes As Needed for Chest Pain. Take no more than 3 doses in 15 minutes., Disp: , Rfl:   •  omega-3 acid ethyl esters (LOVAZA) 1 G capsule, , Disp: , Rfl:   •  Omega-3 Fatty Acids (fish oil) 1000 MG capsule capsule, Take 2 g by mouth., Disp: , Rfl:   •  polyethylene glycol (MIRALAX) packet, Take 17 g by mouth Daily., Disp: , Rfl:   •  prednisoLONE acetate (PRED FORTE) 1 % ophthalmic suspension, prednisolone acetate 1 % eye drops,suspension, Disp: , Rfl:   •  Psyllium (METAMUCIL MULTIHEALTH FIBER PO), Metamucil [employer] Fiber, Disp: , Rfl:   •  Triamcinolone Acetonide (NASACORT) 55 MCG/ACT nasal inhaler, 2 sprays into the nostril(s) as directed by provider Daily., Disp: , Rfl:     ALLERGIES:    Allergies   Allergen Reactions   • Erythromycin Unknown - Low Severity   • Sulfa Antibiotics Unknown - Low Severity   • Amoxicillin-Pot Clavulanate GI Intolerance     Stomach cramping--amoxicillin is OK          Social History     Socioeconomic History   • Marital status:      Spouse name: Radha   • Years of education: High school   Tobacco Use   • Smoking status: Never Smoker   • Smokeless tobacco: Never Used   Substance and Sexual Activity   • Alcohol use: Yes     Comment: Occasionally   • Drug use: No   • Sexual activity: Defer     does not smoke or drink no risk factors for HIV worked in [employer] has no chemicals or toxic exposure     Family History   Problem Relation Age of Onset   • Heart disease Mother   "  • Hypertension Mother    • Cancer Son 43        Squamous cell CA in tonsils      30-year-old son had squamous cell carcinoma of the tonsil 7 years ago and is JESSIE with no other malignancy in the family -nobody has a high hemoglobin in the family     Review of Systems   As per HPI    I have reviewed and confirmed the accuracy of the ROS as documented  LEONEL Guerin      Objective     Vitals:    08/03/22 0954   BP: 146/83   Pulse: 77   Resp: 16   Temp: 98.7 °F (37.1 °C)   TempSrc: Temporal   SpO2: 93%   Weight: 115 kg (254 lb 9.6 oz)   Height: 180 cm (70.87\")   PainSc: 0-No pain     Current Status 8/3/2022   ECOG score 0     Physical Exam  Vitals reviewed.   Constitutional:       General: He is not in acute distress.     Appearance: Normal appearance. He is well-developed.   HENT:      Head: Normocephalic and atraumatic.   Eyes:      Pupils: Pupils are equal, round, and reactive to light.   Cardiovascular:      Rate and Rhythm: Normal rate and regular rhythm.      Heart sounds: Normal heart sounds. No murmur heard.  Pulmonary:      Effort: Pulmonary effort is normal. No respiratory distress.      Breath sounds: Normal breath sounds. No wheezing, rhonchi or rales.   Musculoskeletal:         General: Normal range of motion.      Cervical back: Normal range of motion.   Skin:     General: Skin is warm and dry.      Findings: No rash.   Neurological:      Mental Status: He is alert and oriented to person, place, and time.             RECENT LABS:  Results from last 7 days   Lab Units 08/03/22  0928   WBC 10*3/mm3 10.55   NEUTROS ABS 10*3/mm3 7.20*   HEMOGLOBIN g/dL 15.9   HEMATOCRIT % 49.0   PLATELETS 10*3/mm3 289             US Renal Bilateral (10/06/2021 09:25)      Assessment & Plan     1.  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    Previously receiving " phlebotomies every 2 months and was tolerating well. Had a heart attack in 9/2018 with a hematocrit of 45%.     For a time we did move him to every 3-month phlebotomies however his hematocrit began running around 50%.  We moved him back to every 2 months in 2020 and this has been keeping his hematocrit closer to 48%.    Hematocrit 2/2/2022 of 50.5%.  We will proceed with phlebotomy with goal to maintain hematocrit less than 48%.  He is essentially asymptomatic of his erythrocytosis    8/3/2022 hematocrit 49.0%, we will proceed with phlebotomy today.  Patient is essentially requiring phlebotomy every 2 months, which we will continue.    2.  Bilateral renal cysts.  Original ultrasound in 2016 with multiple bilateral renal cyst.  Repeat ultrasound 10/6/2021 which was completely unchanged.      PLAN:  1. Proceed with phlebotomy today for hematocrit of 49.0%.  2. Goal is to maintain hematocrit less than 48%.  3. Patient to return every 2 months for CBC with possible phlebotomy.  4. Return for follow-up with Dr. Andino in 6 months with repeat CBC and possible phlebotomy.  5. Call/return sooner should develop new concerns or problems.        Saba Bejarano, APRN  08/03/2022

## 2022-10-13 ENCOUNTER — LAB (OUTPATIENT)
Dept: LAB | Facility: HOSPITAL | Age: 77
End: 2022-10-13

## 2022-10-13 ENCOUNTER — INFUSION (OUTPATIENT)
Dept: ONCOLOGY | Facility: HOSPITAL | Age: 77
End: 2022-10-13

## 2022-10-13 VITALS
DIASTOLIC BLOOD PRESSURE: 74 MMHG | TEMPERATURE: 98.4 F | WEIGHT: 255.6 LBS | OXYGEN SATURATION: 95 % | RESPIRATION RATE: 16 BRPM | BODY MASS INDEX: 35.78 KG/M2 | SYSTOLIC BLOOD PRESSURE: 146 MMHG | HEART RATE: 89 BPM

## 2022-10-13 DIAGNOSIS — D75.1 ERYTHROCYTOSIS: Primary | ICD-10-CM

## 2022-10-13 LAB
BASOPHILS # BLD AUTO: 0.14 10*3/MM3 (ref 0–0.2)
BASOPHILS NFR BLD AUTO: 1.5 % (ref 0–1.5)
DEPRECATED RDW RBC AUTO: 44.3 FL (ref 37–54)
EOSINOPHIL # BLD AUTO: 0.14 10*3/MM3 (ref 0–0.4)
EOSINOPHIL NFR BLD AUTO: 1.5 % (ref 0.3–6.2)
ERYTHROCYTE [DISTWIDTH] IN BLOOD BY AUTOMATED COUNT: 15.1 % (ref 12.3–15.4)
HCT VFR BLD AUTO: 47.5 % (ref 37.5–51)
HGB BLD-MCNC: 15.7 G/DL (ref 13–17.7)
IMM GRANULOCYTES # BLD AUTO: 0.11 10*3/MM3 (ref 0–0.05)
IMM GRANULOCYTES NFR BLD AUTO: 1.2 % (ref 0–0.5)
LYMPHOCYTES # BLD AUTO: 1.46 10*3/MM3 (ref 0.7–3.1)
LYMPHOCYTES NFR BLD AUTO: 15.8 % (ref 19.6–45.3)
MCH RBC QN AUTO: 26.8 PG (ref 26.6–33)
MCHC RBC AUTO-ENTMCNC: 33.1 G/DL (ref 31.5–35.7)
MCV RBC AUTO: 81.1 FL (ref 79–97)
MONOCYTES # BLD AUTO: 1.14 10*3/MM3 (ref 0.1–0.9)
MONOCYTES NFR BLD AUTO: 12.3 % (ref 5–12)
NEUTROPHILS NFR BLD AUTO: 6.25 10*3/MM3 (ref 1.7–7)
NEUTROPHILS NFR BLD AUTO: 67.7 % (ref 42.7–76)
NRBC BLD AUTO-RTO: 0 /100 WBC (ref 0–0.2)
PLATELET # BLD AUTO: 271 10*3/MM3 (ref 140–450)
PMV BLD AUTO: 9.4 FL (ref 6–12)
RBC # BLD AUTO: 5.86 10*6/MM3 (ref 4.14–5.8)
WBC NRBC COR # BLD: 9.24 10*3/MM3 (ref 3.4–10.8)

## 2022-10-13 PROCEDURE — 36415 COLL VENOUS BLD VENIPUNCTURE: CPT

## 2022-10-13 PROCEDURE — 85025 COMPLETE CBC W/AUTO DIFF WBC: CPT

## 2022-10-13 PROCEDURE — 99195 PHLEBOTOMY: CPT

## 2022-10-13 RX ORDER — SODIUM CHLORIDE 9 MG/ML
250 INJECTION, SOLUTION INTRAVENOUS ONCE
Status: CANCELLED | OUTPATIENT
Start: 2022-10-20

## 2022-10-17 ENCOUNTER — TRANSCRIBE ORDERS (OUTPATIENT)
Dept: ADMINISTRATIVE | Facility: HOSPITAL | Age: 77
End: 2022-10-17

## 2022-10-17 ENCOUNTER — HOSPITAL ENCOUNTER (OUTPATIENT)
Dept: CARDIOLOGY | Facility: HOSPITAL | Age: 77
Discharge: HOME | End: 2022-10-17

## 2022-10-17 ENCOUNTER — LAB (OUTPATIENT)
Dept: LAB | Facility: HOSPITAL | Age: 77
End: 2022-10-17

## 2022-10-17 DIAGNOSIS — Z01.818 PRE-OP TESTING: ICD-10-CM

## 2022-10-17 DIAGNOSIS — Z01.818 PRE-OP TESTING: Primary | ICD-10-CM

## 2022-10-17 LAB
ANION GAP SERPL CALCULATED.3IONS-SCNC: 8.8 MMOL/L (ref 5–15)
BASOPHILS # BLD AUTO: 0.13 10*3/MM3 (ref 0–0.2)
BASOPHILS NFR BLD AUTO: 1.3 % (ref 0–1.5)
BUN SERPL-MCNC: 16 MG/DL (ref 8–23)
BUN/CREAT SERPL: 15.8 (ref 7–25)
CALCIUM SPEC-SCNC: 9.7 MG/DL (ref 8.6–10.5)
CHLORIDE SERPL-SCNC: 107 MMOL/L (ref 98–107)
CO2 SERPL-SCNC: 26.2 MMOL/L (ref 22–29)
CREAT SERPL-MCNC: 1.01 MG/DL (ref 0.76–1.27)
DEPRECATED RDW RBC AUTO: 43.4 FL (ref 37–54)
EGFRCR SERPLBLD CKD-EPI 2021: 76.6 ML/MIN/1.73
EOSINOPHIL # BLD AUTO: 0.08 10*3/MM3 (ref 0–0.4)
EOSINOPHIL NFR BLD AUTO: 0.8 % (ref 0.3–6.2)
ERYTHROCYTE [DISTWIDTH] IN BLOOD BY AUTOMATED COUNT: 14.4 % (ref 12.3–15.4)
GLUCOSE SERPL-MCNC: 87 MG/DL (ref 65–99)
HCT VFR BLD AUTO: 45.2 % (ref 37.5–51)
HGB BLD-MCNC: 14.7 G/DL (ref 13–17.7)
IMM GRANULOCYTES # BLD AUTO: 0.1 10*3/MM3 (ref 0–0.05)
IMM GRANULOCYTES NFR BLD AUTO: 1 % (ref 0–0.5)
LYMPHOCYTES # BLD AUTO: 1.73 10*3/MM3 (ref 0.7–3.1)
LYMPHOCYTES NFR BLD AUTO: 17.8 % (ref 19.6–45.3)
MCH RBC QN AUTO: 26.9 PG (ref 26.6–33)
MCHC RBC AUTO-ENTMCNC: 32.5 G/DL (ref 31.5–35.7)
MCV RBC AUTO: 82.6 FL (ref 79–97)
MONOCYTES # BLD AUTO: 1.36 10*3/MM3 (ref 0.1–0.9)
MONOCYTES NFR BLD AUTO: 14 % (ref 5–12)
NEUTROPHILS NFR BLD AUTO: 6.31 10*3/MM3 (ref 1.7–7)
NEUTROPHILS NFR BLD AUTO: 65.1 % (ref 42.7–76)
NRBC BLD AUTO-RTO: 0 /100 WBC (ref 0–0.2)
PLATELET # BLD AUTO: 327 10*3/MM3 (ref 140–450)
PMV BLD AUTO: 9.6 FL (ref 6–12)
POTASSIUM SERPL-SCNC: 4.5 MMOL/L (ref 3.5–5.2)
RBC # BLD AUTO: 5.47 10*6/MM3 (ref 4.14–5.8)
SODIUM SERPL-SCNC: 142 MMOL/L (ref 136–145)
WBC NRBC COR # BLD: 9.71 10*3/MM3 (ref 3.4–10.8)

## 2022-10-17 PROCEDURE — 36415 COLL VENOUS BLD VENIPUNCTURE: CPT

## 2022-10-17 PROCEDURE — 85025 COMPLETE CBC W/AUTO DIFF WBC: CPT

## 2022-10-17 PROCEDURE — 93010 ELECTROCARDIOGRAM REPORT: CPT | Performed by: INTERNAL MEDICINE

## 2022-10-17 PROCEDURE — 93005 ELECTROCARDIOGRAM TRACING: CPT | Performed by: UROLOGY

## 2022-10-17 PROCEDURE — 80048 BASIC METABOLIC PNL TOTAL CA: CPT

## 2022-10-19 LAB — QT INTERVAL: 341 MS

## 2022-11-27 PROCEDURE — U0004 COV-19 TEST NON-CDC HGH THRU: HCPCS | Performed by: NURSE PRACTITIONER

## 2022-12-08 ENCOUNTER — LAB (OUTPATIENT)
Dept: LAB | Facility: HOSPITAL | Age: 77
End: 2022-12-08

## 2022-12-08 ENCOUNTER — INFUSION (OUTPATIENT)
Dept: ONCOLOGY | Facility: HOSPITAL | Age: 77
End: 2022-12-08

## 2022-12-08 DIAGNOSIS — D75.1 ERYTHROCYTOSIS: Primary | ICD-10-CM

## 2022-12-08 LAB
BASOPHILS # BLD AUTO: 0.13 10*3/MM3 (ref 0–0.2)
BASOPHILS NFR BLD AUTO: 1.4 % (ref 0–1.5)
DEPRECATED RDW RBC AUTO: 43.9 FL (ref 37–54)
EOSINOPHIL # BLD AUTO: 0.18 10*3/MM3 (ref 0–0.4)
EOSINOPHIL NFR BLD AUTO: 2 % (ref 0.3–6.2)
ERYTHROCYTE [DISTWIDTH] IN BLOOD BY AUTOMATED COUNT: 15.3 % (ref 12.3–15.4)
HCT VFR BLD AUTO: 45.8 % (ref 37.5–51)
HGB BLD-MCNC: 15 G/DL (ref 13–17.7)
IMM GRANULOCYTES # BLD AUTO: 0.15 10*3/MM3 (ref 0–0.05)
IMM GRANULOCYTES NFR BLD AUTO: 1.7 % (ref 0–0.5)
LYMPHOCYTES # BLD AUTO: 2.01 10*3/MM3 (ref 0.7–3.1)
LYMPHOCYTES NFR BLD AUTO: 22.4 % (ref 19.6–45.3)
MCH RBC QN AUTO: 26.3 PG (ref 26.6–33)
MCHC RBC AUTO-ENTMCNC: 32.8 G/DL (ref 31.5–35.7)
MCV RBC AUTO: 80.4 FL (ref 79–97)
MONOCYTES # BLD AUTO: 1.22 10*3/MM3 (ref 0.1–0.9)
MONOCYTES NFR BLD AUTO: 13.6 % (ref 5–12)
NEUTROPHILS NFR BLD AUTO: 5.28 10*3/MM3 (ref 1.7–7)
NEUTROPHILS NFR BLD AUTO: 58.9 % (ref 42.7–76)
NRBC BLD AUTO-RTO: 0 /100 WBC (ref 0–0.2)
PLATELET # BLD AUTO: 270 10*3/MM3 (ref 140–450)
PMV BLD AUTO: 9.3 FL (ref 6–12)
RBC # BLD AUTO: 5.7 10*6/MM3 (ref 4.14–5.8)
WBC NRBC COR # BLD: 8.97 10*3/MM3 (ref 3.4–10.8)

## 2022-12-08 PROCEDURE — 36415 COLL VENOUS BLD VENIPUNCTURE: CPT

## 2022-12-08 PROCEDURE — 85025 COMPLETE CBC W/AUTO DIFF WBC: CPT

## 2022-12-08 NOTE — NURSING NOTE
Lab Results   Component Value Date    WBC 8.97 12/08/2022    HGB 15.0 12/08/2022    HCT 45.8 12/08/2022    MCV 80.4 12/08/2022     12/08/2022     HCT 45.8 SO PT DOES NOT NEED PHLEBO TODAY. GAVE PT COPY OF LABS AND HE HAD NO C/O.

## 2023-01-18 ENCOUNTER — OFFICE VISIT (OUTPATIENT)
Dept: ONCOLOGY | Facility: CLINIC | Age: 78
End: 2023-01-18
Payer: MEDICARE

## 2023-01-18 ENCOUNTER — LAB (OUTPATIENT)
Dept: LAB | Facility: HOSPITAL | Age: 78
End: 2023-01-18
Payer: MEDICARE

## 2023-01-18 ENCOUNTER — INFUSION (OUTPATIENT)
Dept: ONCOLOGY | Facility: HOSPITAL | Age: 78
End: 2023-01-18
Payer: MEDICARE

## 2023-01-18 VITALS
WEIGHT: 254.3 LBS | RESPIRATION RATE: 16 BRPM | OXYGEN SATURATION: 94 % | HEART RATE: 70 BPM | SYSTOLIC BLOOD PRESSURE: 162 MMHG | BODY MASS INDEX: 36.41 KG/M2 | DIASTOLIC BLOOD PRESSURE: 81 MMHG | TEMPERATURE: 97.7 F | HEIGHT: 70 IN

## 2023-01-18 DIAGNOSIS — D75.1 ERYTHROCYTOSIS: Primary | ICD-10-CM

## 2023-01-18 DIAGNOSIS — D75.1 ERYTHROCYTOSIS: ICD-10-CM

## 2023-01-18 LAB
ALBUMIN SERPL-MCNC: 4.5 G/DL (ref 3.5–5.2)
ALBUMIN/GLOB SERPL: 1.5 G/DL (ref 1.1–2.4)
ALP SERPL-CCNC: 89 U/L (ref 38–116)
ALT SERPL W P-5'-P-CCNC: 21 U/L (ref 0–41)
ANION GAP SERPL CALCULATED.3IONS-SCNC: 12.3 MMOL/L (ref 5–15)
AST SERPL-CCNC: 22 U/L (ref 0–40)
BASOPHILS # BLD AUTO: 0.11 10*3/MM3 (ref 0–0.2)
BASOPHILS NFR BLD AUTO: 1.2 % (ref 0–1.5)
BILIRUB SERPL-MCNC: 0.7 MG/DL (ref 0.2–1.2)
BUN SERPL-MCNC: 21 MG/DL (ref 6–20)
BUN/CREAT SERPL: 19.8 (ref 7.3–30)
CALCIUM SPEC-SCNC: 10 MG/DL (ref 8.5–10.2)
CHLORIDE SERPL-SCNC: 104 MMOL/L (ref 98–107)
CO2 SERPL-SCNC: 25.7 MMOL/L (ref 22–29)
CREAT SERPL-MCNC: 1.06 MG/DL (ref 0.7–1.3)
DEPRECATED RDW RBC AUTO: 50.1 FL (ref 37–54)
EGFRCR SERPLBLD CKD-EPI 2021: 71.8 ML/MIN/1.73
EOSINOPHIL # BLD AUTO: 0.12 10*3/MM3 (ref 0–0.4)
EOSINOPHIL NFR BLD AUTO: 1.3 % (ref 0.3–6.2)
ERYTHROCYTE [DISTWIDTH] IN BLOOD BY AUTOMATED COUNT: 17.8 % (ref 12.3–15.4)
GLOBULIN UR ELPH-MCNC: 3 GM/DL (ref 1.8–3.5)
GLUCOSE SERPL-MCNC: 97 MG/DL (ref 74–124)
HCT VFR BLD AUTO: 51.8 % (ref 37.5–51)
HGB BLD-MCNC: 15.9 G/DL (ref 13–17.7)
IMM GRANULOCYTES # BLD AUTO: 0.06 10*3/MM3 (ref 0–0.05)
IMM GRANULOCYTES NFR BLD AUTO: 0.6 % (ref 0–0.5)
LYMPHOCYTES # BLD AUTO: 1.74 10*3/MM3 (ref 0.7–3.1)
LYMPHOCYTES NFR BLD AUTO: 18.8 % (ref 19.6–45.3)
MCH RBC QN AUTO: 25.6 PG (ref 26.6–33)
MCHC RBC AUTO-ENTMCNC: 30.7 G/DL (ref 31.5–35.7)
MCV RBC AUTO: 83.5 FL (ref 79–97)
MONOCYTES # BLD AUTO: 1.09 10*3/MM3 (ref 0.1–0.9)
MONOCYTES NFR BLD AUTO: 11.7 % (ref 5–12)
NEUTROPHILS NFR BLD AUTO: 6.16 10*3/MM3 (ref 1.7–7)
NEUTROPHILS NFR BLD AUTO: 66.4 % (ref 42.7–76)
NRBC BLD AUTO-RTO: 0 /100 WBC (ref 0–0.2)
PLATELET # BLD AUTO: 307 10*3/MM3 (ref 140–450)
PMV BLD AUTO: 8.8 FL (ref 6–12)
POTASSIUM SERPL-SCNC: 4.1 MMOL/L (ref 3.5–4.7)
PROT SERPL-MCNC: 7.5 G/DL (ref 6.3–8)
RBC # BLD AUTO: 6.2 10*6/MM3 (ref 4.14–5.8)
SODIUM SERPL-SCNC: 142 MMOL/L (ref 134–145)
WBC NRBC COR # BLD: 9.28 10*3/MM3 (ref 3.4–10.8)

## 2023-01-18 PROCEDURE — 85025 COMPLETE CBC W/AUTO DIFF WBC: CPT

## 2023-01-18 PROCEDURE — 36415 COLL VENOUS BLD VENIPUNCTURE: CPT

## 2023-01-18 PROCEDURE — 80053 COMPREHEN METABOLIC PANEL: CPT

## 2023-01-18 PROCEDURE — 99195 PHLEBOTOMY: CPT

## 2023-01-18 PROCEDURE — 99214 OFFICE O/P EST MOD 30 MIN: CPT | Performed by: INTERNAL MEDICINE

## 2023-01-18 RX ORDER — SODIUM CHLORIDE 9 MG/ML
250 INJECTION, SOLUTION INTRAVENOUS ONCE
Status: CANCELLED | OUTPATIENT
Start: 2023-01-25

## 2023-01-18 NOTE — PROGRESS NOTES
Subjective     REASON FOR FOLLOWUP:   1  Erythrocytosis left shifted oxygen dissociation curve?  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  Bilateral kidney cysts  3.  Persistent elevated carboxyhemoglobin with no obvious cause                             REQUESTING PHYSICIAN: Alessandra ANDUJAR    History of Present Illness Mr. Medel is a mary 77 y.o.  male with the above-mentioned history who is here today for lab review and possible phlebotomy.  Overall he tolerates the phlebotomies quite well.      He reports that he is asymptomatic prior to having the phlebotomies, although he does feel improved wellbeing about a day or 2 after the phlebotomy.  He typically does not require post phlebotomy hydration.  He has no new problems or concerns today otherwise.     He has had no cerebrovascular cardiovascular problems since September 2018 when he had an MI at that time his hematocrit was actually 45% but I do think this is beneficial for him to prevent strokes and we will continue his phlebotomies as long as he can tolerate them    Past Medical History:   Diagnosis Date   • CAD (coronary artery disease)    • H/O Blood dyscrasia    • History of snoring    • Hyperlipidemia    • Hypertension    • Kidney stone    • Myocardial infarction (HCC) 09/22/2018    placed one stent   • Seasonal allergies    • Skin cancer     Left forearm    skin cancers for which he takes light treatments periodically    Past Surgical History:   Procedure Laterality Date   • CARDIAC CATHETERIZATION  2018   • CATARACT EXTRACTION     • COLONOSCOPY      x4   • CORONARY STENT PLACEMENT  2018   • ENDOSCOPY  1972   • HERNIA REPAIR  1995   • KIDNEY STONE SURGERY  2006   • SKIN CANCER EXCISION      Cheek-squamous cell and top of head-basal cell carcioma   • URETEROSCOPY Right    • VASECTOMY  1982        HEME HISTORY:  patient is a 70-year-old male fairly good health except for hypertension and hypercholesterolemia and kidney stones was noted on recent  blood work to have an elevated hemoglobin and hematocrit of 17/50% on 2 or 3 occasions.  Patient was referred to us for evaluation of this.  He has had some recent headaches and lightheadedness which she attributes to his blood pressure medicines but otherwise feels well.  His note night sweats weight loss or loss of appetite or pain anywhere.  He has been on the same blood pressure medicines for many years.  On questioning him further he reports that when he was in his early 20s he works as a  and his hemoglobin was high in the 16 g range even then and he thinks this may be a long term problem that he has had.  His 2 children and 3 sisters but does not know of anybody else has an elevated hemoglobin.  He is not a smoker does not have sleep apnea and has a carbon monoxide monitoring in his home.  He has had no recent CAT scans of the abdomen no hematuria or kidney disease.  He is not taking any testosterone injections or preparations.    71-year-old male with long-standing erythrocytosis returns today to review the results of his blood work and imaging.  We did a chest x-ray because he complained of a chronic cough and this is thankfully benign except for some mild hyperinflation/COPD which is unusual as he is a nonsmoker.  Suspect the lisinopril is causing his cough.  Ultrasound of kidneys showed bilateral large kidney cysts but no masses.  His erythropoietin level was low at 5 and his, carbon monoxide level was mildly elevated 2.4 and his P 50 was left shifted suggesting a possible-high affinity hemoglobin!  His tolerating phlebotomy well and he actually felt better after the phlebotomy and therefore we will continue for the time being although I did tell him that I would repeat his carbon monoxide level to make sure it is back down as he got a new furnace for his home and this may cause some changes.  And he could get his phlebotomies at the [facility] because he has no evidence of polycythemia vera/ bone  marrow disorder at this point.        Current Outpatient Medications:   •  aspirin 81 MG disintegrating tablet, Take 81 mg by mouth., Disp: , Rfl:   •  atorvastatin (LIPITOR) 40 MG tablet, 40 mg., Disp: , Rfl:   •  benzonatate (TESSALON) 200 MG capsule, Take 1 capsule by mouth 3 (Three) Times a Day As Needed for Cough., Disp: 30 capsule, Rfl: 0  •  hydrocortisone 2.5 % cream, , Disp: , Rfl:   •  lisinopril (PRINIVIL,ZESTRIL) 10 MG tablet, Take 10 mg by mouth Daily., Disp: , Rfl:   •  nitroglycerin (NITROSTAT) 0.4 MG SL tablet, Place 0.4 mg under the tongue Every 5 (Five) Minutes As Needed for Chest Pain. Take no more than 3 doses in 15 minutes., Disp: , Rfl:   •  omega-3 acid ethyl esters (LOVAZA) 1 G capsule, , Disp: , Rfl:   •  Omega-3 Fatty Acids (fish oil) 1000 MG capsule capsule, Every 12 (Twelve) Hours., Disp: , Rfl:   •  polyethylene glycol (MIRALAX) packet, Take 17 g by mouth Daily., Disp: , Rfl:   •  Psyllium (METAMUCIL MULTIHEALTH FIBER PO), Metamucil Multi[employer] Fiber, Disp: , Rfl:   •  Triamcinolone Acetonide (NASACORT) 55 MCG/ACT nasal inhaler, 2 sprays into the nostril(s) as directed by provider Daily., Disp: , Rfl:     ALLERGIES:    Allergies   Allergen Reactions   • Erythromycin Unknown - Low Severity   • Sulfa Antibiotics Unknown - Low Severity   • Amoxicillin-Pot Clavulanate GI Intolerance     Stomach cramping--amoxicillin is OK          Social History     Socioeconomic History   • Marital status:      Spouse name: Radha   • Years of education: High school   Tobacco Use   • Smoking status: Never   • Smokeless tobacco: Never   Substance and Sexual Activity   • Alcohol use: Yes     Comment: Occasionally   • Drug use: No   • Sexual activity: Defer     does not smoke or drink no risk factors for HIV worked in [employer] has no chemicals or toxic exposure     Family History   Problem Relation Age of Onset   • Heart disease Mother    • Hypertension Mother    • Cancer Son 43       "  Squamous cell CA in tonsils      30-year-old son had squamous cell carcinoma of the tonsil 7 years ago and is JESSIE with no other malignancy in the family -nobody has a high hemoglobin in the family     Review of Systems   As per HPI    I have reviewed and confirmed the accuracy of the ROS as documented  Yoav Andino MD      Objective     Vitals:    01/18/23 0937   BP: 162/81   Pulse: 70   Resp: 16   Temp: 97.7 °F (36.5 °C)   TempSrc: Temporal   SpO2: 94%   Weight: 115 kg (254 lb 4.8 oz)   Height: 177.8 cm (70\")   PainSc: 0-No pain     Current Status 1/18/2023   ECOG score 0       CONSTITUTIONAL:  Vital signs reviewed.  No distress, looks comfortable.  EYES:  Conjunctivae and lids unremarkable.  PERRLA  EARS,NOSE,MOUTH,THROAT:  Ears and nose appear unremarkable.  Lips, teeth, gums appear unremarkable.  RESPIRATORY:  Normal respiratory effort.  Lungs clear to auscultation bilaterally.  CARDIOVASCULAR:  Normal S1, S2.  No murmurs rubs or gallops.  No significant lower extremity edema.  GASTROINTESTINAL: Abdomen appears unremarkable.  Nontender.  No hepatomegaly.  No splenomegaly.  LYMPHATIC:  No cervical, supraclavicular, axillary lymphadenopathy.  SKIN:  Warm.  No rashes.  PSYCHIATRIC:  Normal judgment and insight.  Normal mood and affect.          RECENT LABS:  Results from last 7 days   Lab Units 01/18/23  0904   WBC 10*3/mm3 9.28   NEUTROS ABS 10*3/mm3 6.16   HEMOGLOBIN g/dL 15.9   HEMATOCRIT % 51.8*   PLATELETS 10*3/mm3 307             US Renal Bilateral (10/06/2021 09:25)      Assessment & Plan     1.  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    · Previously receiving phlebotomies every 2 months and was tolerating well. Had a heart attack in 9/2018 with a hematocrit of 45%.     · For a time we did move him to every 3-month phlebotomies however his hematocrit " began running around 50%.  We moved him back to every 2 months in 2020 and this has been keeping his hematocrit closer to 48%.    · Hematocrit 2/2/2022 of 50.5%.  We will proceed with phlebotomy with goal to maintain hematocrit less than 48%.  He is essentially asymptomatic of his erythrocytosis    · 8/3/2022 hematocrit 49.0%, we will proceed with phlebotomy today.  Patient is essentially requiring phlebotomy every 2 months, which we will continue.    2.  Bilateral renal cysts.  Original ultrasound in 2016 with multiple bilateral renal cyst.  Repeat ultrasound 10/6/2021 which was completely unchanged.    3. MI   [id number]    PLAN:  1. Proceed with phlebotomy today for hematocrit of 51%.  2. Goal is to maintain hematocrit less than 48%.  3. Patient to return every 2 months for CBC with possible phlebotomy.  4. Return for follow-up with   Dr. Andino in 12  months with repeat CBC and possible phlebotomy.  5. Call/return sooner should develop new concerns or problems.        Yoav Andino MD  01/18/2023

## 2023-03-15 ENCOUNTER — LAB (OUTPATIENT)
Dept: LAB | Facility: HOSPITAL | Age: 78
End: 2023-03-15
Payer: MEDICARE

## 2023-03-15 ENCOUNTER — INFUSION (OUTPATIENT)
Dept: ONCOLOGY | Facility: HOSPITAL | Age: 78
End: 2023-03-15
Payer: MEDICARE

## 2023-03-15 VITALS
OXYGEN SATURATION: 94 % | RESPIRATION RATE: 16 BRPM | TEMPERATURE: 98.2 F | DIASTOLIC BLOOD PRESSURE: 80 MMHG | BODY MASS INDEX: 36.82 KG/M2 | WEIGHT: 256.6 LBS | HEART RATE: 84 BPM | SYSTOLIC BLOOD PRESSURE: 153 MMHG

## 2023-03-15 DIAGNOSIS — D75.1 ERYTHROCYTOSIS: ICD-10-CM

## 2023-03-15 DIAGNOSIS — D75.1 ERYTHROCYTOSIS: Primary | ICD-10-CM

## 2023-03-15 LAB
BASOPHILS # BLD AUTO: 0.12 10*3/MM3 (ref 0–0.2)
BASOPHILS NFR BLD AUTO: 1.2 % (ref 0–1.5)
DEPRECATED RDW RBC AUTO: 48.4 FL (ref 37–54)
EOSINOPHIL # BLD AUTO: 0.16 10*3/MM3 (ref 0–0.4)
EOSINOPHIL NFR BLD AUTO: 1.6 % (ref 0.3–6.2)
ERYTHROCYTE [DISTWIDTH] IN BLOOD BY AUTOMATED COUNT: 17.4 % (ref 12.3–15.4)
HCT VFR BLD AUTO: 49 % (ref 37.5–51)
HGB BLD-MCNC: 15.5 G/DL (ref 13–17.7)
IMM GRANULOCYTES # BLD AUTO: 0.08 10*3/MM3 (ref 0–0.05)
IMM GRANULOCYTES NFR BLD AUTO: 0.8 % (ref 0–0.5)
LYMPHOCYTES # BLD AUTO: 1.97 10*3/MM3 (ref 0.7–3.1)
LYMPHOCYTES NFR BLD AUTO: 20.3 % (ref 19.6–45.3)
MCH RBC QN AUTO: 25.9 PG (ref 26.6–33)
MCHC RBC AUTO-ENTMCNC: 31.6 G/DL (ref 31.5–35.7)
MCV RBC AUTO: 81.8 FL (ref 79–97)
MONOCYTES # BLD AUTO: 1.16 10*3/MM3 (ref 0.1–0.9)
MONOCYTES NFR BLD AUTO: 11.9 % (ref 5–12)
NEUTROPHILS NFR BLD AUTO: 6.22 10*3/MM3 (ref 1.7–7)
NEUTROPHILS NFR BLD AUTO: 64.2 % (ref 42.7–76)
NRBC BLD AUTO-RTO: 0 /100 WBC (ref 0–0.2)
PLATELET # BLD AUTO: 307 10*3/MM3 (ref 140–450)
PMV BLD AUTO: 9.6 FL (ref 6–12)
RBC # BLD AUTO: 5.99 10*6/MM3 (ref 4.14–5.8)
WBC NRBC COR # BLD: 9.71 10*3/MM3 (ref 3.4–10.8)

## 2023-03-15 PROCEDURE — 99195 PHLEBOTOMY: CPT

## 2023-03-15 PROCEDURE — 85025 COMPLETE CBC W/AUTO DIFF WBC: CPT

## 2023-03-15 PROCEDURE — 36415 COLL VENOUS BLD VENIPUNCTURE: CPT

## 2023-03-15 RX ORDER — SODIUM CHLORIDE 9 MG/ML
250 INJECTION, SOLUTION INTRAVENOUS ONCE
OUTPATIENT
Start: 2023-03-22

## 2023-05-10 ENCOUNTER — LAB (OUTPATIENT)
Dept: LAB | Facility: HOSPITAL | Age: 78
End: 2023-05-10
Payer: MEDICARE

## 2023-05-10 ENCOUNTER — INFUSION (OUTPATIENT)
Dept: ONCOLOGY | Facility: HOSPITAL | Age: 78
End: 2023-05-10
Payer: MEDICARE

## 2023-05-10 VITALS
WEIGHT: 255.2 LBS | HEART RATE: 92 BPM | RESPIRATION RATE: 16 BRPM | SYSTOLIC BLOOD PRESSURE: 136 MMHG | OXYGEN SATURATION: 93 % | DIASTOLIC BLOOD PRESSURE: 69 MMHG | BODY MASS INDEX: 36.62 KG/M2 | TEMPERATURE: 98.7 F

## 2023-05-10 DIAGNOSIS — D75.1 ERYTHROCYTOSIS: ICD-10-CM

## 2023-05-10 DIAGNOSIS — D75.1 ERYTHROCYTOSIS: Primary | ICD-10-CM

## 2023-05-10 LAB
BASOPHILS # BLD AUTO: 0.13 10*3/MM3 (ref 0–0.2)
BASOPHILS NFR BLD AUTO: 1.2 % (ref 0–1.5)
DEPRECATED RDW RBC AUTO: 46.7 FL (ref 37–54)
EOSINOPHIL # BLD AUTO: 0.2 10*3/MM3 (ref 0–0.4)
EOSINOPHIL NFR BLD AUTO: 1.9 % (ref 0.3–6.2)
ERYTHROCYTE [DISTWIDTH] IN BLOOD BY AUTOMATED COUNT: 15.9 % (ref 12.3–15.4)
HCT VFR BLD AUTO: 48.1 % (ref 37.5–51)
HGB BLD-MCNC: 15.3 G/DL (ref 13–17.7)
IMM GRANULOCYTES # BLD AUTO: 0.07 10*3/MM3 (ref 0–0.05)
IMM GRANULOCYTES NFR BLD AUTO: 0.7 % (ref 0–0.5)
LYMPHOCYTES # BLD AUTO: 1.78 10*3/MM3 (ref 0.7–3.1)
LYMPHOCYTES NFR BLD AUTO: 16.8 % (ref 19.6–45.3)
MCH RBC QN AUTO: 26.3 PG (ref 26.6–33)
MCHC RBC AUTO-ENTMCNC: 31.8 G/DL (ref 31.5–35.7)
MCV RBC AUTO: 82.6 FL (ref 79–97)
MONOCYTES # BLD AUTO: 1.27 10*3/MM3 (ref 0.1–0.9)
MONOCYTES NFR BLD AUTO: 12 % (ref 5–12)
NEUTROPHILS NFR BLD AUTO: 67.4 % (ref 42.7–76)
NEUTROPHILS NFR BLD AUTO: 7.12 10*3/MM3 (ref 1.7–7)
NRBC BLD AUTO-RTO: 0 /100 WBC (ref 0–0.2)
PLATELET # BLD AUTO: 285 10*3/MM3 (ref 140–450)
PMV BLD AUTO: 9.8 FL (ref 6–12)
RBC # BLD AUTO: 5.82 10*6/MM3 (ref 4.14–5.8)
WBC NRBC COR # BLD: 10.57 10*3/MM3 (ref 3.4–10.8)

## 2023-05-10 PROCEDURE — 36415 COLL VENOUS BLD VENIPUNCTURE: CPT

## 2023-05-10 PROCEDURE — 85025 COMPLETE CBC W/AUTO DIFF WBC: CPT

## 2023-05-10 PROCEDURE — 99195 PHLEBOTOMY: CPT

## 2023-05-10 RX ORDER — SODIUM CHLORIDE 9 MG/ML
250 INJECTION, SOLUTION INTRAVENOUS ONCE
OUTPATIENT
Start: 2023-05-17

## 2023-08-03 ENCOUNTER — INFUSION (OUTPATIENT)
Dept: ONCOLOGY | Facility: HOSPITAL | Age: 78
End: 2023-08-03
Payer: MEDICARE

## 2023-08-03 ENCOUNTER — LAB (OUTPATIENT)
Dept: LAB | Facility: HOSPITAL | Age: 78
End: 2023-08-03
Payer: MEDICARE

## 2023-08-03 VITALS
SYSTOLIC BLOOD PRESSURE: 148 MMHG | OXYGEN SATURATION: 96 % | DIASTOLIC BLOOD PRESSURE: 73 MMHG | WEIGHT: 256 LBS | HEART RATE: 93 BPM | TEMPERATURE: 97.8 F | BODY MASS INDEX: 36.73 KG/M2 | RESPIRATION RATE: 16 BRPM

## 2023-08-03 DIAGNOSIS — D75.1 ERYTHROCYTOSIS: Primary | ICD-10-CM

## 2023-08-03 DIAGNOSIS — D75.1 ERYTHROCYTOSIS: ICD-10-CM

## 2023-08-03 LAB
BASOPHILS # BLD AUTO: 0.09 10*3/MM3 (ref 0–0.2)
BASOPHILS NFR BLD AUTO: 0.8 % (ref 0–1.5)
DEPRECATED RDW RBC AUTO: 47.9 FL (ref 37–54)
EOSINOPHIL # BLD AUTO: 0.18 10*3/MM3 (ref 0–0.4)
EOSINOPHIL NFR BLD AUTO: 1.7 % (ref 0.3–6.2)
ERYTHROCYTE [DISTWIDTH] IN BLOOD BY AUTOMATED COUNT: 17.1 % (ref 12.3–15.4)
HCT VFR BLD AUTO: 48.6 % (ref 37.5–51)
HGB BLD-MCNC: 15.6 G/DL (ref 13–17.7)
IMM GRANULOCYTES # BLD AUTO: 0.12 10*3/MM3 (ref 0–0.05)
IMM GRANULOCYTES NFR BLD AUTO: 1.1 % (ref 0–0.5)
LYMPHOCYTES # BLD AUTO: 1.83 10*3/MM3 (ref 0.7–3.1)
LYMPHOCYTES NFR BLD AUTO: 16.9 % (ref 19.6–45.3)
MCH RBC QN AUTO: 26 PG (ref 26.6–33)
MCHC RBC AUTO-ENTMCNC: 32.1 G/DL (ref 31.5–35.7)
MCV RBC AUTO: 81.1 FL (ref 79–97)
MONOCYTES # BLD AUTO: 1.25 10*3/MM3 (ref 0.1–0.9)
MONOCYTES NFR BLD AUTO: 11.6 % (ref 5–12)
NEUTROPHILS NFR BLD AUTO: 67.9 % (ref 42.7–76)
NEUTROPHILS NFR BLD AUTO: 7.33 10*3/MM3 (ref 1.7–7)
NRBC BLD AUTO-RTO: 0 /100 WBC (ref 0–0.2)
PLATELET # BLD AUTO: 273 10*3/MM3 (ref 140–450)
PMV BLD AUTO: 9.4 FL (ref 6–12)
RBC # BLD AUTO: 5.99 10*6/MM3 (ref 4.14–5.8)
WBC NRBC COR # BLD: 10.8 10*3/MM3 (ref 3.4–10.8)

## 2023-08-03 PROCEDURE — 85025 COMPLETE CBC W/AUTO DIFF WBC: CPT

## 2023-08-03 PROCEDURE — 99195 PHLEBOTOMY: CPT

## 2023-08-03 PROCEDURE — 36415 COLL VENOUS BLD VENIPUNCTURE: CPT

## 2023-10-25 ENCOUNTER — INFUSION (OUTPATIENT)
Dept: ONCOLOGY | Facility: HOSPITAL | Age: 78
End: 2023-10-25
Payer: MEDICARE

## 2023-10-25 ENCOUNTER — LAB (OUTPATIENT)
Dept: LAB | Facility: HOSPITAL | Age: 78
End: 2023-10-25
Payer: MEDICARE

## 2023-10-25 VITALS
OXYGEN SATURATION: 95 % | RESPIRATION RATE: 16 BRPM | DIASTOLIC BLOOD PRESSURE: 57 MMHG | HEART RATE: 93 BPM | TEMPERATURE: 98 F | WEIGHT: 255.8 LBS | SYSTOLIC BLOOD PRESSURE: 143 MMHG | BODY MASS INDEX: 36.7 KG/M2

## 2023-10-25 DIAGNOSIS — D75.1 ERYTHROCYTOSIS: Primary | ICD-10-CM

## 2023-10-25 DIAGNOSIS — D75.1 ERYTHROCYTOSIS: ICD-10-CM

## 2023-10-25 LAB
BASOPHILS # BLD AUTO: 0.12 10*3/MM3 (ref 0–0.2)
BASOPHILS NFR BLD AUTO: 1.1 % (ref 0–1.5)
DEPRECATED RDW RBC AUTO: 48.9 FL (ref 37–54)
EOSINOPHIL # BLD AUTO: 0.24 10*3/MM3 (ref 0–0.4)
EOSINOPHIL NFR BLD AUTO: 2.3 % (ref 0.3–6.2)
ERYTHROCYTE [DISTWIDTH] IN BLOOD BY AUTOMATED COUNT: 17.7 % (ref 12.3–15.4)
HCT VFR BLD AUTO: 50.1 % (ref 37.5–51)
HGB BLD-MCNC: 16.1 G/DL (ref 13–17.7)
IMM GRANULOCYTES # BLD AUTO: 0.12 10*3/MM3 (ref 0–0.05)
IMM GRANULOCYTES NFR BLD AUTO: 1.1 % (ref 0–0.5)
LYMPHOCYTES # BLD AUTO: 1.51 10*3/MM3 (ref 0.7–3.1)
LYMPHOCYTES NFR BLD AUTO: 14.3 % (ref 19.6–45.3)
MCH RBC QN AUTO: 26.4 PG (ref 26.6–33)
MCHC RBC AUTO-ENTMCNC: 32.1 G/DL (ref 31.5–35.7)
MCV RBC AUTO: 82 FL (ref 79–97)
MONOCYTES # BLD AUTO: 1.49 10*3/MM3 (ref 0.1–0.9)
MONOCYTES NFR BLD AUTO: 14.1 % (ref 5–12)
NEUTROPHILS NFR BLD AUTO: 67.1 % (ref 42.7–76)
NEUTROPHILS NFR BLD AUTO: 7.09 10*3/MM3 (ref 1.7–7)
NRBC BLD AUTO-RTO: 0 /100 WBC (ref 0–0.2)
PLATELET # BLD AUTO: 285 10*3/MM3 (ref 140–450)
PMV BLD AUTO: 9.6 FL (ref 6–12)
RBC # BLD AUTO: 6.11 10*6/MM3 (ref 4.14–5.8)
WBC NRBC COR # BLD: 10.57 10*3/MM3 (ref 3.4–10.8)

## 2023-10-25 PROCEDURE — 99195 PHLEBOTOMY: CPT

## 2023-10-25 PROCEDURE — 85025 COMPLETE CBC W/AUTO DIFF WBC: CPT

## 2023-10-25 PROCEDURE — 36415 COLL VENOUS BLD VENIPUNCTURE: CPT

## 2023-10-25 RX ORDER — SODIUM CHLORIDE 9 MG/ML
250 INJECTION, SOLUTION INTRAVENOUS ONCE
OUTPATIENT
Start: 2023-11-01

## 2023-12-20 ENCOUNTER — LAB (OUTPATIENT)
Dept: LAB | Facility: HOSPITAL | Age: 78
End: 2023-12-20
Payer: MEDICARE

## 2023-12-20 ENCOUNTER — INFUSION (OUTPATIENT)
Dept: ONCOLOGY | Facility: HOSPITAL | Age: 78
End: 2023-12-20
Payer: MEDICARE

## 2023-12-20 DIAGNOSIS — D75.1 ERYTHROCYTOSIS: ICD-10-CM

## 2023-12-20 LAB
BASOPHILS # BLD AUTO: 0.12 10*3/MM3 (ref 0–0.2)
BASOPHILS NFR BLD AUTO: 1.2 % (ref 0–1.5)
DEPRECATED RDW RBC AUTO: 46.3 FL (ref 37–54)
EOSINOPHIL # BLD AUTO: 0.21 10*3/MM3 (ref 0–0.4)
EOSINOPHIL NFR BLD AUTO: 2.1 % (ref 0.3–6.2)
ERYTHROCYTE [DISTWIDTH] IN BLOOD BY AUTOMATED COUNT: 15.7 % (ref 12.3–15.4)
HCT VFR BLD AUTO: 46.2 % (ref 37.5–51)
HGB BLD-MCNC: 15.4 G/DL (ref 13–17.7)
IMM GRANULOCYTES # BLD AUTO: 0.07 10*3/MM3 (ref 0–0.05)
IMM GRANULOCYTES NFR BLD AUTO: 0.7 % (ref 0–0.5)
LYMPHOCYTES # BLD AUTO: 1.96 10*3/MM3 (ref 0.7–3.1)
LYMPHOCYTES NFR BLD AUTO: 19.2 % (ref 19.6–45.3)
MCH RBC QN AUTO: 27.2 PG (ref 26.6–33)
MCHC RBC AUTO-ENTMCNC: 33.3 G/DL (ref 31.5–35.7)
MCV RBC AUTO: 81.6 FL (ref 79–97)
MONOCYTES # BLD AUTO: 1.31 10*3/MM3 (ref 0.1–0.9)
MONOCYTES NFR BLD AUTO: 12.8 % (ref 5–12)
NEUTROPHILS NFR BLD AUTO: 6.55 10*3/MM3 (ref 1.7–7)
NEUTROPHILS NFR BLD AUTO: 64 % (ref 42.7–76)
NRBC BLD AUTO-RTO: 0 /100 WBC (ref 0–0.2)
PLATELET # BLD AUTO: 281 10*3/MM3 (ref 140–450)
PMV BLD AUTO: 9.8 FL (ref 6–12)
RBC # BLD AUTO: 5.66 10*6/MM3 (ref 4.14–5.8)
WBC NRBC COR # BLD AUTO: 10.22 10*3/MM3 (ref 3.4–10.8)

## 2023-12-20 PROCEDURE — G0463 HOSPITAL OUTPT CLINIC VISIT: HCPCS

## 2023-12-20 PROCEDURE — 36415 COLL VENOUS BLD VENIPUNCTURE: CPT

## 2023-12-20 PROCEDURE — 85025 COMPLETE CBC W/AUTO DIFF WBC: CPT

## 2023-12-20 NOTE — NURSING NOTE
Pt presents for phlebotomy for HCT > 48 today. Per treatment parameters, no phlebotomy needed. Copy of labs given and pt v/u.     Lab Results   Component Value Date    WBC 10.22 12/20/2023    HGB 15.4 12/20/2023    HCT 46.2 12/20/2023    MCV 81.6 12/20/2023     12/20/2023

## 2024-02-19 RX ORDER — SODIUM CHLORIDE 9 MG/ML
250 INJECTION, SOLUTION INTRAVENOUS ONCE
OUTPATIENT
Start: 2024-02-19

## 2024-02-21 ENCOUNTER — OFFICE VISIT (OUTPATIENT)
Dept: ONCOLOGY | Facility: CLINIC | Age: 79
End: 2024-02-21
Payer: MEDICARE

## 2024-02-21 ENCOUNTER — INFUSION (OUTPATIENT)
Dept: ONCOLOGY | Facility: HOSPITAL | Age: 79
End: 2024-02-21
Payer: MEDICARE

## 2024-02-21 ENCOUNTER — LAB (OUTPATIENT)
Dept: LAB | Facility: HOSPITAL | Age: 79
End: 2024-02-21
Payer: MEDICARE

## 2024-02-21 VITALS — DIASTOLIC BLOOD PRESSURE: 80 MMHG | SYSTOLIC BLOOD PRESSURE: 157 MMHG

## 2024-02-21 VITALS
TEMPERATURE: 98.2 F | SYSTOLIC BLOOD PRESSURE: 129 MMHG | OXYGEN SATURATION: 94 % | HEART RATE: 79 BPM | BODY MASS INDEX: 36.72 KG/M2 | WEIGHT: 255.9 LBS | DIASTOLIC BLOOD PRESSURE: 78 MMHG | RESPIRATION RATE: 18 BRPM

## 2024-02-21 DIAGNOSIS — D75.1 ERYTHROCYTOSIS: Primary | ICD-10-CM

## 2024-02-21 DIAGNOSIS — D75.1 ERYTHROCYTOSIS: ICD-10-CM

## 2024-02-21 LAB
ALBUMIN SERPL-MCNC: 4.5 G/DL (ref 3.5–5.2)
ALBUMIN/GLOB SERPL: 1.6 G/DL
ALP SERPL-CCNC: 76 U/L (ref 39–117)
ALT SERPL W P-5'-P-CCNC: 19 U/L (ref 1–41)
ANION GAP SERPL CALCULATED.3IONS-SCNC: 10.4 MMOL/L (ref 5–15)
AST SERPL-CCNC: 27 U/L (ref 1–40)
BASOPHILS # BLD AUTO: 0.1 10*3/MM3 (ref 0–0.2)
BASOPHILS NFR BLD AUTO: 1 % (ref 0–1.5)
BILIRUB SERPL-MCNC: 0.7 MG/DL (ref 0–1.2)
BUN SERPL-MCNC: 24 MG/DL (ref 8–23)
BUN/CREAT SERPL: 21.4 (ref 7–25)
CALCIUM SPEC-SCNC: 10 MG/DL (ref 8.6–10.5)
CHLORIDE SERPL-SCNC: 105 MMOL/L (ref 98–107)
CO2 SERPL-SCNC: 27.6 MMOL/L (ref 22–29)
CREAT SERPL-MCNC: 1.12 MG/DL (ref 0.76–1.27)
DEPRECATED RDW RBC AUTO: 53.3 FL (ref 37–54)
EGFRCR SERPLBLD CKD-EPI 2021: 66.8 ML/MIN/1.73
EOSINOPHIL # BLD AUTO: 0.1 10*3/MM3 (ref 0–0.4)
EOSINOPHIL NFR BLD AUTO: 1 % (ref 0.3–6.2)
ERYTHROCYTE [DISTWIDTH] IN BLOOD BY AUTOMATED COUNT: 18.4 % (ref 12.3–15.4)
GLOBULIN UR ELPH-MCNC: 2.8 GM/DL
GLUCOSE SERPL-MCNC: 105 MG/DL (ref 65–99)
HCT VFR BLD AUTO: 53.1 % (ref 37.5–51)
HGB BLD-MCNC: 17 G/DL (ref 13–17.7)
IMM GRANULOCYTES # BLD AUTO: 0.08 10*3/MM3 (ref 0–0.05)
IMM GRANULOCYTES NFR BLD AUTO: 0.8 % (ref 0–0.5)
LYMPHOCYTES # BLD AUTO: 1.76 10*3/MM3 (ref 0.7–3.1)
LYMPHOCYTES NFR BLD AUTO: 18 % (ref 19.6–45.3)
MCH RBC QN AUTO: 27.4 PG (ref 26.6–33)
MCHC RBC AUTO-ENTMCNC: 32 G/DL (ref 31.5–35.7)
MCV RBC AUTO: 85.6 FL (ref 79–97)
MONOCYTES # BLD AUTO: 1.02 10*3/MM3 (ref 0.1–0.9)
MONOCYTES NFR BLD AUTO: 10.4 % (ref 5–12)
NEUTROPHILS NFR BLD AUTO: 6.71 10*3/MM3 (ref 1.7–7)
NEUTROPHILS NFR BLD AUTO: 68.8 % (ref 42.7–76)
NRBC BLD AUTO-RTO: 0 /100 WBC (ref 0–0.2)
PLATELET # BLD AUTO: 287 10*3/MM3 (ref 140–450)
PMV BLD AUTO: 9.3 FL (ref 6–12)
POTASSIUM SERPL-SCNC: 5.1 MMOL/L (ref 3.5–5.2)
PROT SERPL-MCNC: 7.3 G/DL (ref 6–8.5)
RBC # BLD AUTO: 6.2 10*6/MM3 (ref 4.14–5.8)
SODIUM SERPL-SCNC: 143 MMOL/L (ref 136–145)
WBC NRBC COR # BLD AUTO: 9.77 10*3/MM3 (ref 3.4–10.8)

## 2024-02-21 PROCEDURE — 99195 PHLEBOTOMY: CPT

## 2024-02-21 PROCEDURE — 85025 COMPLETE CBC W/AUTO DIFF WBC: CPT

## 2024-02-21 PROCEDURE — 80053 COMPREHEN METABOLIC PANEL: CPT

## 2024-02-21 PROCEDURE — 36415 COLL VENOUS BLD VENIPUNCTURE: CPT

## 2024-02-21 NOTE — PROGRESS NOTES
Subjective     REASON FOR FOLLOWUP:   1  Erythrocytosis left shifted oxygen dissociation curve?  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  Bilateral kidney cysts  3.  Persistent elevated carboxyhemoglobin with no obvious cause                             REQUESTING PHYSICIAN: Alessandra ANDUJAR    History of Present Illness Mr. Medel is a mary 78 y.o.  male with the above-mentioned history who is here today for lab review and possible phlebotomy.  Overall he tolerates the phlebotomies quite well.  He does not require IV fluids    He reports that he is asymptomatic prior to having the phlebotomies, although today he feels flushed and a little congested in his head and his hematocrit is 53%.  He does feel improved wellbeing about a day or 2 after the phlebotomy.  He has no new problems or concerns today otherwise.     He has had no cerebrovascular cardiovascular problems since September 2018 when he had an MI at that time his hematocrit was actually 45% but I do think this is beneficial for him to prevent strokes and we will continue his phlebotomies as long as he can tolerate them    Past Medical History:   Diagnosis Date    CAD (coronary artery disease)     H/O Blood dyscrasia     History of snoring     Hyperlipidemia     Hypertension     Kidney stone     Myocardial infarction 09/22/2018    placed one stent    Seasonal allergies     Skin cancer     Left forearm    skin cancers for which he takes light treatments periodically    Past Surgical History:   Procedure Laterality Date    CARDIAC CATHETERIZATION  2018    CATARACT EXTRACTION      COLONOSCOPY      x4    CORONARY STENT PLACEMENT  2018    ENDOSCOPY  1972    HERNIA REPAIR  1995    KIDNEY STONE SURGERY  2006    SKIN CANCER EXCISION      Cheek-squamous cell and top of head-basal cell carcioma    URETEROSCOPY Right     VASECTOMY  1982        HEME HISTORY:  patient is a 70-year-old male fairly good health except for hypertension and hypercholesterolemia and kidney  stones was noted on recent blood work to have an elevated hemoglobin and hematocrit of 17/50% on 2 or 3 occasions.  Patient was referred to us for evaluation of this.  He has had some recent headaches and lightheadedness which she attributes to his blood pressure medicines but otherwise feels well.  His note night sweats weight loss or loss of appetite or pain anywhere.  He has been on the same blood pressure medicines for many years.  On questioning him further he reports that when he was in his early 20s he works as a  and his hemoglobin was high in the 16 g range even then and he thinks this may be a long term problem that he has had.  His 2 children and 3 sisters but does not know of anybody else has an elevated hemoglobin.  He is not a smoker does not have sleep apnea and has a carbon monoxide monitoring in his home.  He has had no recent CAT scans of the abdomen no hematuria or kidney disease.  He is not taking any testosterone injections or preparations.    71-year-old male with long-standing erythrocytosis returns today to review the results of his blood work and imaging.  We did a chest x-ray because he complained of a chronic cough and this is thankfully benign except for some mild hyperinflation/COPD which is unusual as he is a nonsmoker.  Suspect the lisinopril is causing his cough.  Ultrasound of kidneys showed bilateral large kidney cysts but no masses.  His erythropoietin level was low at 5 and his, carbon monoxide level was mildly elevated 2.4 and his P 50 was left shifted suggesting a possible-high affinity hemoglobin!  His tolerating phlebotomy well and he actually felt better after the phlebotomy and therefore we will continue for the time being although I did tell him that I would repeat his carbon monoxide level to make sure it is back down as he got a new furnace for his home and this may cause some changes.  And he could get his phlebotomies at the [facility] because he has no evidence  of polycythemia vera/ bone marrow disorder at this point.        Current Outpatient Medications:     aspirin 81 MG disintegrating tablet, Take 81 mg by mouth., Disp: , Rfl:     atorvastatin (LIPITOR) 40 MG tablet, 1 tablet., Disp: , Rfl:     benzonatate (TESSALON) 200 MG capsule, Take 1 capsule by mouth 3 (Three) Times a Day As Needed for Cough., Disp: 30 capsule, Rfl: 0    hydrocortisone 2.5 % cream, , Disp: , Rfl:     lisinopril (PRINIVIL,ZESTRIL) 10 MG tablet, Take 1 tablet by mouth Daily., Disp: , Rfl:     nitroglycerin (NITROSTAT) 0.4 MG SL tablet, Place 1 tablet under the tongue Every 5 (Five) Minutes As Needed for Chest Pain. Take no more than 3 doses in 15 minutes., Disp: , Rfl:     omega-3 acid ethyl esters (LOVAZA) 1 G capsule, , Disp: , Rfl:     Omega-3 Fatty Acids (fish oil) 1000 MG capsule capsule, Every 12 (Twelve) Hours., Disp: , Rfl:     polyethylene glycol (MIRALAX) packet, Take 17 g by mouth Daily., Disp: , Rfl:     Psyllium (METAMUCIL MULTIHEALTH FIBER PO), Metamucil MultiHealth Fiber, Disp: , Rfl:     Triamcinolone Acetonide (NASACORT) 55 MCG/ACT nasal inhaler, 2 sprays into the nostril(s) as directed by provider Daily., Disp: , Rfl:     ALLERGIES:    Allergies   Allergen Reactions    Erythromycin Unknown - Low Severity    Sulfa Antibiotics Unknown - Low Severity    Amoxicillin-Pot Clavulanate GI Intolerance     Stomach cramping--amoxicillin is OK          Social History     Socioeconomic History    Marital status:      Spouse name: Radha    Years of education: High school   Tobacco Use    Smoking status: Never    Smokeless tobacco: Never   Substance and Sexual Activity    Alcohol use: Yes     Comment: Occasionally    Drug use: No    Sexual activity: Defer     does not smoke or drink no risk factors for HIV worked in [employer] has no chemicals or toxic exposure     Family History   Problem Relation Age of Onset    Heart disease Mother     Hypertension Mother     Cancer  Son 43        Squamous cell CA in tonsils      30-year-old son had squamous cell carcinoma of the tonsil 7 years ago and is JESSIE with no other malignancy in the family -nobody has a high hemoglobin in the family     Review of Systems   As per HPI    I have reviewed and confirmed the accuracy of the ROS as documented  Yoav Andino MD      Objective     Vitals:    02/21/24 1207   BP: 129/78   Pulse: 79   Resp: 18   Temp: 98.2 °F (36.8 °C)   TempSrc: Temporal   SpO2: 94%   Weight: 116 kg (255 lb 14.4 oz)   PainSc: 0-No pain           2/21/2024    12:02 PM   Current Status   ECOG score 0       CONSTITUTIONAL:  Vital signs reviewed.  No distress, looks comfortable.  Plethoric  EYES:  Conjunctivae and lids unremarkable.  PERRLA  EARS,NOSE,MOUTH,THROAT:  Ears and nose appear unremarkable.  Lips, teeth, gums appear unremarkable.  RESPIRATORY:  Normal respiratory effort.  Lungs clear to auscultation bilaterally.  CARDIOVASCULAR:  Normal S1, S2.  No murmurs rubs or gallops.  No significant lower extremity edema.  GASTROINTESTINAL: Abdomen appears unremarkable.  Nontender.  No hepatomegaly.  No splenomegaly.  LYMPHATIC:  No cervical, supraclavicular, axillary lymphadenopathy.  SKIN:  Warm.  No rashes.  PSYCHIATRIC:  Normal judgment and insight.  Normal mood and affect.  I have reexamined the patient and the results are consistent with the previously documented exam. Yoav Andino MD           RECENT LABS:  Results from last 7 days   Lab Units 02/21/24  1140   WBC 10*3/mm3 9.77   NEUTROS ABS 10*3/mm3 6.71   HEMOGLOBIN g/dL 17.0   HEMATOCRIT % 53.1*   PLATELETS 10*3/mm3 287     Results from last 7 days   Lab Units 02/21/24  1140   SODIUM mmol/L 143   POTASSIUM mmol/L 5.1   CHLORIDE mmol/L 105   CO2 mmol/L 27.6   BUN mg/dL 24*   CREATININE mg/dL 1.12   CALCIUM mg/dL 10.0   ALBUMIN g/dL 4.5   BILIRUBIN mg/dL 0.7   ALK PHOS U/L 76   ALT (SGPT) U/L 19   AST (SGOT) U/L 27   GLUCOSE mg/dL 105*         US Renal Bilateral  (10/06/2021 09:25)      Assessment & Plan     1.  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    Previously receiving phlebotomies every 2 months and was tolerating well. Had a heart attack in 9/2018 with a hematocrit of 45%.     For a time we did move him to every 3-month phlebotomies however his hematocrit began running around 50%.  We moved him back to every 2 months in 2020 and this has been keeping his hematocrit closer to 48%.    Hematocrit 2/2/2022 of 50.5%.  We will proceed with phlebotomy with goal to maintain hematocrit less than 48%.  He is essentially asymptomatic of his erythrocytosis    8/3/2022 hematocrit 49.0%, we will proceed with phlebotomy today.  Patient is essentially requiring phlebotomy every 2 months, which we will continue.    2.  Bilateral renal cysts.  Original ultrasound in 2016 with multiple bilateral renal cyst.  Repeat ultrasound 10/6/2021 which was completely unchanged.    3. MI   [id number]    PLAN:  Proceed with phlebotomy today for hematocrit of 53%.  Goal is to maintain hematocrit less than 48%.  Patient to return in 3/5/8/10/13mo for CBC with possible phlebotomy.  Return for follow-up with   Dr. Andino in 13  months with repeat CBC and possible phlebotomy.  Call/return sooner should develop new concerns or problems.        Yoav Andino MD  02/21/2024

## 2024-05-15 ENCOUNTER — INFUSION (OUTPATIENT)
Dept: ONCOLOGY | Facility: HOSPITAL | Age: 79
End: 2024-05-15
Payer: MEDICARE

## 2024-05-15 ENCOUNTER — LAB (OUTPATIENT)
Dept: LAB | Facility: HOSPITAL | Age: 79
End: 2024-05-15
Payer: MEDICARE

## 2024-05-15 VITALS
DIASTOLIC BLOOD PRESSURE: 76 MMHG | HEART RATE: 88 BPM | SYSTOLIC BLOOD PRESSURE: 144 MMHG | RESPIRATION RATE: 18 BRPM | TEMPERATURE: 98.2 F | WEIGHT: 259 LBS | BODY MASS INDEX: 37.16 KG/M2 | OXYGEN SATURATION: 95 %

## 2024-05-15 DIAGNOSIS — D75.1 ERYTHROCYTOSIS: Primary | ICD-10-CM

## 2024-05-15 DIAGNOSIS — D75.1 ERYTHROCYTOSIS: ICD-10-CM

## 2024-05-15 LAB
BASOPHILS # BLD AUTO: 0.14 10*3/MM3 (ref 0–0.2)
BASOPHILS NFR BLD AUTO: 1.3 % (ref 0–1.5)
DEPRECATED RDW RBC AUTO: 46.5 FL (ref 37–54)
EOSINOPHIL # BLD AUTO: 0.09 10*3/MM3 (ref 0–0.4)
EOSINOPHIL NFR BLD AUTO: 0.8 % (ref 0.3–6.2)
ERYTHROCYTE [DISTWIDTH] IN BLOOD BY AUTOMATED COUNT: 15.3 % (ref 12.3–15.4)
HCT VFR BLD AUTO: 48.3 % (ref 37.5–51)
HGB BLD-MCNC: 16 G/DL (ref 13–17.7)
IMM GRANULOCYTES # BLD AUTO: 0.2 10*3/MM3 (ref 0–0.05)
IMM GRANULOCYTES NFR BLD AUTO: 1.8 % (ref 0–0.5)
LYMPHOCYTES # BLD AUTO: 2.27 10*3/MM3 (ref 0.7–3.1)
LYMPHOCYTES NFR BLD AUTO: 20.3 % (ref 19.6–45.3)
MCH RBC QN AUTO: 28 PG (ref 26.6–33)
MCHC RBC AUTO-ENTMCNC: 33.1 G/DL (ref 31.5–35.7)
MCV RBC AUTO: 84.4 FL (ref 79–97)
MONOCYTES # BLD AUTO: 0.81 10*3/MM3 (ref 0.1–0.9)
MONOCYTES NFR BLD AUTO: 7.2 % (ref 5–12)
NEUTROPHILS NFR BLD AUTO: 68.6 % (ref 42.7–76)
NEUTROPHILS NFR BLD AUTO: 7.68 10*3/MM3 (ref 1.7–7)
NRBC BLD AUTO-RTO: 0 /100 WBC (ref 0–0.2)
PLATELET # BLD AUTO: 308 10*3/MM3 (ref 140–450)
PMV BLD AUTO: 10.1 FL (ref 6–12)
RBC # BLD AUTO: 5.72 10*6/MM3 (ref 4.14–5.8)
WBC NRBC COR # BLD AUTO: 11.19 10*3/MM3 (ref 3.4–10.8)

## 2024-05-15 PROCEDURE — 99195 PHLEBOTOMY: CPT

## 2024-05-15 PROCEDURE — 36415 COLL VENOUS BLD VENIPUNCTURE: CPT

## 2024-05-15 PROCEDURE — 85025 COMPLETE CBC W/AUTO DIFF WBC: CPT

## 2024-06-20 ENCOUNTER — HOSPITAL ENCOUNTER (OUTPATIENT)
Facility: HOSPITAL | Age: 79
Discharge: HOME | End: 2024-06-20
Attending: EMERGENCY MEDICINE | Admitting: EMERGENCY MEDICINE
Payer: MEDICARE

## 2024-06-20 ENCOUNTER — APPOINTMENT (OUTPATIENT)
Dept: GENERAL RADIOLOGY | Facility: HOSPITAL | Age: 79
End: 2024-06-20
Payer: MEDICARE

## 2024-06-20 VITALS
RESPIRATION RATE: 18 BRPM | OXYGEN SATURATION: 95 % | WEIGHT: 254.8 LBS | TEMPERATURE: 98.3 F | SYSTOLIC BLOOD PRESSURE: 160 MMHG | HEART RATE: 88 BPM | HEIGHT: 70 IN | BODY MASS INDEX: 36.48 KG/M2 | DIASTOLIC BLOOD PRESSURE: 70 MMHG

## 2024-06-20 DIAGNOSIS — J06.9 VIRAL UPPER RESPIRATORY TRACT INFECTION WITH COUGH: Primary | ICD-10-CM

## 2024-06-20 LAB
FLUAV SUBTYP SPEC NAA+PROBE: NOT DETECTED
FLUBV RNA ISLT QL NAA+PROBE: NOT DETECTED
SARS-COV-2 RNA RESP QL NAA+PROBE: NOT DETECTED

## 2024-06-20 PROCEDURE — 71045 X-RAY EXAM CHEST 1 VIEW: CPT

## 2024-06-20 PROCEDURE — 87636 SARSCOV2 & INF A&B AMP PRB: CPT | Performed by: EMERGENCY MEDICINE

## 2024-06-20 PROCEDURE — G0463 HOSPITAL OUTPT CLINIC VISIT: HCPCS | Performed by: NURSE PRACTITIONER

## 2024-06-20 PROCEDURE — 99213 OFFICE O/P EST LOW 20 MIN: CPT | Performed by: NURSE PRACTITIONER

## 2024-06-20 RX ORDER — AMOXICILLIN 500 MG/1
1000 CAPSULE ORAL 2 TIMES DAILY
Qty: 40 CAPSULE | Refills: 0 | Status: SHIPPED | OUTPATIENT
Start: 2024-06-28 | End: 2024-07-08

## 2024-06-20 NOTE — FSED PROVIDER NOTE
"Subjective   History of Present Illness  The patient is a 78-year-old male who presents to the ER with cough and congestion for the past 2 to 3 days.  Patient denies any fevers. Patient reports think I have an infection. Patient reports he is taking stahist. Patient reports the only thing that works is \"high dose amoxil, not low dose, only high dose.\"    History provided by:  Patient   used: No        Review of Systems   HENT:  Positive for congestion.    Respiratory:  Positive for cough.        Past Medical History:   Diagnosis Date    CAD (coronary artery disease)     H/O Blood dyscrasia     History of snoring     Hyperlipidemia     Hypertension     Kidney stone     Myocardial infarction 09/22/2018    placed one stent    Seasonal allergies     Skin cancer     Left forearm       Allergies   Allergen Reactions    Erythromycin Unknown - Low Severity    Sulfa Antibiotics Unknown - Low Severity    Amoxicillin-Pot Clavulanate GI Intolerance     Stomach cramping--amoxicillin is OK         Past Surgical History:   Procedure Laterality Date    CARDIAC CATHETERIZATION  2018    CATARACT EXTRACTION      COLONOSCOPY      x4    CORONARY STENT PLACEMENT  2018    ENDOSCOPY  1972    HERNIA REPAIR  1995    KIDNEY STONE SURGERY  2006    SKIN CANCER EXCISION      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   Constitutional:  "      Appearance: Normal appearance.   HENT:      Head: Normocephalic.      Right Ear: Tympanic membrane, ear canal and external ear normal.      Left Ear: Tympanic membrane, ear canal and external ear normal.      Nose: Nose normal.      Mouth/Throat:      Lips: Pink.      Mouth: Mucous membranes are moist.      Pharynx: Oropharynx is clear. Uvula midline.   Eyes:      Conjunctiva/sclera: Conjunctivae normal.      Pupils: Pupils are equal, round, and reactive to light.   Cardiovascular:      Rate and Rhythm: Normal rate and regular rhythm.      Pulses: Normal pulses.      Heart sounds: Normal heart sounds.   Pulmonary:      Effort: Pulmonary effort is normal.      Breath sounds: Normal breath sounds and air entry.   Abdominal:      General: Bowel sounds are normal.      Palpations: Abdomen is soft.   Musculoskeletal:         General: Normal range of motion.      Cervical back: Full passive range of motion without pain, normal range of motion and neck supple.      Right lower leg: No edema.      Left lower leg: No edema.   Skin:     General: Skin is warm and dry.   Neurological:      General: No focal deficit present.      Mental Status: He is alert and oriented to person, place, and time.   Psychiatric:         Mood and Affect: Mood normal.         Behavior: Behavior normal. Behavior is cooperative.         Procedures           ED Course  ED Course as of 06/20/24 1800   Thu Jun 20, 2024   1421 COVID19: Not Detected [DS]   1421 Influenza A PCR: Not Detected [DS]   1421 Influenza B PCR: Not Detected [DS]   1513 XR CHEST 1 VW     Date of Exam: 6/20/2024 2:43 PM EDT     Indication: Cough and chest congestion     Comparison: 3/11/2020.     Findings:  The heart size is normal. The pulmonary vascular markings are normal. The lungs and pleural spaces are clear of active disease. There are chronic age-related changes involving the bony thorax and thoracic aorta.     IMPRESSION:  Impression:  No active disease.   [DS]      ED  Course User Index  [DS] Margarita Delatorre LEONEL Renteria                                           Medical Decision Making  The patient is a 78-year-old male who presents to the ER with cough and congestion for the past 2 to 3 days.  Patient denies any fevers. Patient reports think I have an infection. Patient reports he is taking stahist.     Patient is negative for COVID/Flu,   Chest Xray is unremarkable at this time.     This patient presents with symptoms suspicious for  viral upper respiratory infection. Based on history and physical doubt sinusitis. Do not suspect underlying cardiopulmonary process. I considered, but think unlikely, dangerous causes of this patient's symptoms to include ACS, CHF or COPD exacerbations, pneumonia, pneumothorax. Patient is nontoxic appearing and not in need of emergent medical intervention. Patient advised will give him a wait and see  RX for amoxil that he can get filled on 06/28/24 if he continues to have symptoms. Patient in agreement with plan         Problems Addressed:  Viral upper respiratory tract infection with cough: complicated acute illness or injury    Amount and/or Complexity of Data Reviewed  Labs:  Decision-making details documented in ED Course.  Radiology: ordered. Decision-making details documented in ED Course.    Risk  Prescription drug management.        Final diagnoses:   Viral upper respiratory tract infection with cough       ED Disposition  ED Disposition       ED Disposition   Discharge    Condition   Stable    Comment   --               Branden Raman MD  [address] [address]  [phone]    In 1 week  As needed, If symptoms worsen         Medication List        New Prescriptions      amoxicillin 500 MG capsule  Commonly known as: AMOXIL  Take 2 capsules by mouth 2 (Two) Times a Day for 10 days. Do not fill till 07/08/2024  Start taking on: June 28, 2024               Where to Get Your Medications        You can get these  medications from any pharmacy    Bring a paper prescription for each of these medications  amoxicillin 500 MG capsule

## 2024-06-20 NOTE — DISCHARGE INSTRUCTIONS
Follow-up with primary care for further evaluation and treatment as needed.    Tylenol/Motrin as needed for pain/fevers    We have given you a prescription if you are still having symptom on 07/08/2024,  you may get it filled,     Return for any new or worsening symptoms

## 2024-07-10 ENCOUNTER — LAB (OUTPATIENT)
Dept: LAB | Facility: HOSPITAL | Age: 79
End: 2024-07-10
Payer: MEDICARE

## 2024-07-10 ENCOUNTER — INFUSION (OUTPATIENT)
Dept: ONCOLOGY | Facility: HOSPITAL | Age: 79
End: 2024-07-10
Payer: MEDICARE

## 2024-07-10 VITALS
RESPIRATION RATE: 16 BRPM | OXYGEN SATURATION: 91 % | SYSTOLIC BLOOD PRESSURE: 144 MMHG | BODY MASS INDEX: 35.21 KG/M2 | HEART RATE: 92 BPM | DIASTOLIC BLOOD PRESSURE: 73 MMHG | TEMPERATURE: 97.5 F | WEIGHT: 245.4 LBS

## 2024-07-10 DIAGNOSIS — D75.1 ERYTHROCYTOSIS: Primary | ICD-10-CM

## 2024-07-10 DIAGNOSIS — D75.1 ERYTHROCYTOSIS: ICD-10-CM

## 2024-07-10 LAB
BASOPHILS # BLD AUTO: 0.14 10*3/MM3 (ref 0–0.2)
BASOPHILS NFR BLD AUTO: 1.1 % (ref 0–1.5)
DEPRECATED RDW RBC AUTO: 46.2 FL (ref 37–54)
EOSINOPHIL # BLD AUTO: 0.28 10*3/MM3 (ref 0–0.4)
EOSINOPHIL NFR BLD AUTO: 2.1 % (ref 0.3–6.2)
ERYTHROCYTE [DISTWIDTH] IN BLOOD BY AUTOMATED COUNT: 15.9 % (ref 12.3–15.4)
HCT VFR BLD AUTO: 49 % (ref 37.5–51)
HGB BLD-MCNC: 16.3 G/DL (ref 13–17.7)
IMM GRANULOCYTES # BLD AUTO: 0.24 10*3/MM3 (ref 0–0.05)
IMM GRANULOCYTES NFR BLD AUTO: 1.8 % (ref 0–0.5)
LYMPHOCYTES # BLD AUTO: 1.98 10*3/MM3 (ref 0.7–3.1)
LYMPHOCYTES NFR BLD AUTO: 15.1 % (ref 19.6–45.3)
MCH RBC QN AUTO: 28.1 PG (ref 26.6–33)
MCHC RBC AUTO-ENTMCNC: 33.3 G/DL (ref 31.5–35.7)
MCV RBC AUTO: 84.3 FL (ref 79–97)
MONOCYTES # BLD AUTO: 1.55 10*3/MM3 (ref 0.1–0.9)
MONOCYTES NFR BLD AUTO: 11.8 % (ref 5–12)
NEUTROPHILS NFR BLD AUTO: 68.1 % (ref 42.7–76)
NEUTROPHILS NFR BLD AUTO: 8.91 10*3/MM3 (ref 1.7–7)
NRBC BLD AUTO-RTO: 0 /100 WBC (ref 0–0.2)
PLATELET # BLD AUTO: 284 10*3/MM3 (ref 140–450)
PMV BLD AUTO: 9.8 FL (ref 6–12)
RBC # BLD AUTO: 5.81 10*6/MM3 (ref 4.14–5.8)
WBC NRBC COR # BLD AUTO: 13.1 10*3/MM3 (ref 3.4–10.8)

## 2024-07-10 PROCEDURE — 36415 COLL VENOUS BLD VENIPUNCTURE: CPT

## 2024-07-10 PROCEDURE — 99195 PHLEBOTOMY: CPT

## 2024-07-10 PROCEDURE — 85025 COMPLETE CBC W/AUTO DIFF WBC: CPT

## 2024-10-02 ENCOUNTER — INFUSION (OUTPATIENT)
Dept: ONCOLOGY | Facility: HOSPITAL | Age: 79
End: 2024-10-02
Payer: MEDICARE

## 2024-10-02 ENCOUNTER — LAB (OUTPATIENT)
Dept: LAB | Facility: HOSPITAL | Age: 79
End: 2024-10-02
Payer: MEDICARE

## 2024-10-02 VITALS
TEMPERATURE: 98 F | HEART RATE: 101 BPM | DIASTOLIC BLOOD PRESSURE: 65 MMHG | RESPIRATION RATE: 16 BRPM | OXYGEN SATURATION: 94 % | SYSTOLIC BLOOD PRESSURE: 125 MMHG | BODY MASS INDEX: 37.13 KG/M2 | WEIGHT: 258.8 LBS

## 2024-10-02 DIAGNOSIS — D75.1 ERYTHROCYTOSIS: Primary | ICD-10-CM

## 2024-10-02 DIAGNOSIS — D75.1 ERYTHROCYTOSIS: ICD-10-CM

## 2024-10-02 LAB
BASOPHILS # BLD AUTO: 0.14 10*3/MM3 (ref 0–0.2)
BASOPHILS NFR BLD AUTO: 1.1 % (ref 0–1.5)
DEPRECATED RDW RBC AUTO: 49.3 FL (ref 37–54)
EOSINOPHIL # BLD AUTO: 0.17 10*3/MM3 (ref 0–0.4)
EOSINOPHIL NFR BLD AUTO: 1.3 % (ref 0.3–6.2)
ERYTHROCYTE [DISTWIDTH] IN BLOOD BY AUTOMATED COUNT: 16.4 % (ref 12.3–15.4)
HCT VFR BLD AUTO: 49 % (ref 37.5–51)
HGB BLD-MCNC: 16.2 G/DL (ref 13–17.7)
IMM GRANULOCYTES # BLD AUTO: 0.15 10*3/MM3 (ref 0–0.05)
IMM GRANULOCYTES NFR BLD AUTO: 1.2 % (ref 0–0.5)
LYMPHOCYTES # BLD AUTO: 2.12 10*3/MM3 (ref 0.7–3.1)
LYMPHOCYTES NFR BLD AUTO: 16.4 % (ref 19.6–45.3)
MCH RBC QN AUTO: 27.7 PG (ref 26.6–33)
MCHC RBC AUTO-ENTMCNC: 33.1 G/DL (ref 31.5–35.7)
MCV RBC AUTO: 83.9 FL (ref 79–97)
MONOCYTES # BLD AUTO: 1.66 10*3/MM3 (ref 0.1–0.9)
MONOCYTES NFR BLD AUTO: 12.8 % (ref 5–12)
NEUTROPHILS NFR BLD AUTO: 67.2 % (ref 42.7–76)
NEUTROPHILS NFR BLD AUTO: 8.71 10*3/MM3 (ref 1.7–7)
NRBC BLD AUTO-RTO: 0 /100 WBC (ref 0–0.2)
PLATELET # BLD AUTO: 269 10*3/MM3 (ref 140–450)
PMV BLD AUTO: 10.4 FL (ref 6–12)
RBC # BLD AUTO: 5.84 10*6/MM3 (ref 4.14–5.8)
WBC NRBC COR # BLD AUTO: 12.95 10*3/MM3 (ref 3.4–10.8)

## 2024-10-02 PROCEDURE — 99195 PHLEBOTOMY: CPT

## 2024-10-02 PROCEDURE — 36415 COLL VENOUS BLD VENIPUNCTURE: CPT

## 2024-10-02 PROCEDURE — 85025 COMPLETE CBC W/AUTO DIFF WBC: CPT

## 2024-11-27 ENCOUNTER — INFUSION (OUTPATIENT)
Dept: ONCOLOGY | Facility: HOSPITAL | Age: 79
End: 2024-11-27
Payer: MEDICARE

## 2024-11-27 ENCOUNTER — LAB (OUTPATIENT)
Dept: LAB | Facility: HOSPITAL | Age: 79
End: 2024-11-27
Payer: MEDICARE

## 2024-11-27 VITALS
OXYGEN SATURATION: 95 % | TEMPERATURE: 97.7 F | DIASTOLIC BLOOD PRESSURE: 74 MMHG | RESPIRATION RATE: 16 BRPM | WEIGHT: 255 LBS | BODY MASS INDEX: 36.59 KG/M2 | SYSTOLIC BLOOD PRESSURE: 146 MMHG | HEART RATE: 84 BPM

## 2024-11-27 DIAGNOSIS — D75.1 ERYTHROCYTOSIS: ICD-10-CM

## 2024-11-27 DIAGNOSIS — D75.1 ERYTHROCYTOSIS: Primary | ICD-10-CM

## 2024-11-27 LAB
BASOPHILS # BLD AUTO: 0.13 10*3/MM3 (ref 0–0.2)
BASOPHILS NFR BLD AUTO: 1.2 % (ref 0–1.5)
DEPRECATED RDW RBC AUTO: 46.6 FL (ref 37–54)
EOSINOPHIL # BLD AUTO: 0.12 10*3/MM3 (ref 0–0.4)
EOSINOPHIL NFR BLD AUTO: 1.1 % (ref 0.3–6.2)
ERYTHROCYTE [DISTWIDTH] IN BLOOD BY AUTOMATED COUNT: 15 % (ref 12.3–15.4)
HCT VFR BLD AUTO: 50 % (ref 37.5–51)
HGB BLD-MCNC: 16.3 G/DL (ref 13–17.7)
IMM GRANULOCYTES # BLD AUTO: 0.16 10*3/MM3 (ref 0–0.05)
IMM GRANULOCYTES NFR BLD AUTO: 1.4 % (ref 0–0.5)
LYMPHOCYTES # BLD AUTO: 2.14 10*3/MM3 (ref 0.7–3.1)
LYMPHOCYTES NFR BLD AUTO: 19 % (ref 19.6–45.3)
MCH RBC QN AUTO: 27.7 PG (ref 26.6–33)
MCHC RBC AUTO-ENTMCNC: 32.6 G/DL (ref 31.5–35.7)
MCV RBC AUTO: 85 FL (ref 79–97)
MONOCYTES # BLD AUTO: 1.35 10*3/MM3 (ref 0.1–0.9)
MONOCYTES NFR BLD AUTO: 12 % (ref 5–12)
NEUTROPHILS NFR BLD AUTO: 65.3 % (ref 42.7–76)
NEUTROPHILS NFR BLD AUTO: 7.39 10*3/MM3 (ref 1.7–7)
NRBC BLD AUTO-RTO: 0 /100 WBC (ref 0–0.2)
PLATELET # BLD AUTO: 246 10*3/MM3 (ref 140–450)
PMV BLD AUTO: 9.9 FL (ref 6–12)
RBC # BLD AUTO: 5.88 10*6/MM3 (ref 4.14–5.8)
WBC NRBC COR # BLD AUTO: 11.29 10*3/MM3 (ref 3.4–10.8)

## 2024-11-27 PROCEDURE — 85025 COMPLETE CBC W/AUTO DIFF WBC: CPT

## 2024-11-27 PROCEDURE — 36415 COLL VENOUS BLD VENIPUNCTURE: CPT

## 2024-11-27 PROCEDURE — 99195 PHLEBOTOMY: CPT

## 2024-11-27 RX ORDER — SODIUM CHLORIDE 9 MG/ML
250 INJECTION, SOLUTION INTRAVENOUS ONCE
OUTPATIENT
Start: 2024-12-04

## 2025-02-19 ENCOUNTER — PRIOR AUTHORIZATION (OUTPATIENT)
Dept: ONCOLOGY | Facility: CLINIC | Age: 80
End: 2025-02-19
Payer: MEDICARE

## 2025-02-19 ENCOUNTER — LAB (OUTPATIENT)
Dept: LAB | Facility: HOSPITAL | Age: 80
End: 2025-02-19
Payer: MEDICARE

## 2025-02-19 ENCOUNTER — INFUSION (OUTPATIENT)
Dept: ONCOLOGY | Facility: HOSPITAL | Age: 80
End: 2025-02-19
Payer: MEDICARE

## 2025-02-19 ENCOUNTER — OFFICE VISIT (OUTPATIENT)
Dept: ONCOLOGY | Facility: CLINIC | Age: 80
End: 2025-02-19
Payer: MEDICARE

## 2025-02-19 VITALS
OXYGEN SATURATION: 93 % | DIASTOLIC BLOOD PRESSURE: 79 MMHG | WEIGHT: 256 LBS | BODY MASS INDEX: 36.65 KG/M2 | HEART RATE: 78 BPM | SYSTOLIC BLOOD PRESSURE: 139 MMHG | TEMPERATURE: 97.8 F | RESPIRATION RATE: 16 BRPM | HEIGHT: 70 IN

## 2025-02-19 VITALS — SYSTOLIC BLOOD PRESSURE: 145 MMHG | HEART RATE: 78 BPM | DIASTOLIC BLOOD PRESSURE: 82 MMHG

## 2025-02-19 DIAGNOSIS — D75.1 ERYTHROCYTOSIS: Primary | ICD-10-CM

## 2025-02-19 DIAGNOSIS — D75.1 ERYTHROCYTOSIS: ICD-10-CM

## 2025-02-19 LAB
ALBUMIN SERPL-MCNC: 4.1 G/DL (ref 3.5–5.2)
ALBUMIN/GLOB SERPL: 1.5 G/DL
ALP SERPL-CCNC: 80 U/L (ref 39–117)
ALT SERPL W P-5'-P-CCNC: 23 U/L (ref 1–41)
ANION GAP SERPL CALCULATED.3IONS-SCNC: 11.7 MMOL/L (ref 5–15)
AST SERPL-CCNC: 25 U/L (ref 1–40)
BASOPHILS # BLD AUTO: 0.1 10*3/MM3 (ref 0–0.2)
BASOPHILS NFR BLD AUTO: 1.1 % (ref 0–1.5)
BILIRUB SERPL-MCNC: 0.7 MG/DL (ref 0–1.2)
BUN SERPL-MCNC: 16 MG/DL (ref 8–23)
BUN/CREAT SERPL: 16.3 (ref 7–25)
CALCIUM SPEC-SCNC: 9.5 MG/DL (ref 8.6–10.5)
CHLORIDE SERPL-SCNC: 104 MMOL/L (ref 98–107)
CO2 SERPL-SCNC: 23.3 MMOL/L (ref 22–29)
CREAT SERPL-MCNC: 0.98 MG/DL (ref 0.76–1.27)
DEPRECATED RDW RBC AUTO: 47 FL (ref 37–54)
EGFRCR SERPLBLD CKD-EPI 2021: 78 ML/MIN/1.73
EOSINOPHIL # BLD AUTO: 0.16 10*3/MM3 (ref 0–0.4)
EOSINOPHIL NFR BLD AUTO: 1.8 % (ref 0.3–6.2)
ERYTHROCYTE [DISTWIDTH] IN BLOOD BY AUTOMATED COUNT: 15.8 % (ref 12.3–15.4)
GLOBULIN UR ELPH-MCNC: 2.8 GM/DL
GLUCOSE SERPL-MCNC: 94 MG/DL (ref 65–99)
HCT VFR BLD AUTO: 50.6 % (ref 37.5–51)
HGB BLD-MCNC: 16.2 G/DL (ref 13–17.7)
IMM GRANULOCYTES # BLD AUTO: 0.06 10*3/MM3 (ref 0–0.05)
IMM GRANULOCYTES NFR BLD AUTO: 0.7 % (ref 0–0.5)
LYMPHOCYTES # BLD AUTO: 1.67 10*3/MM3 (ref 0.7–3.1)
LYMPHOCYTES NFR BLD AUTO: 18.7 % (ref 19.6–45.3)
MCH RBC QN AUTO: 26.9 PG (ref 26.6–33)
MCHC RBC AUTO-ENTMCNC: 32 G/DL (ref 31.5–35.7)
MCV RBC AUTO: 83.9 FL (ref 79–97)
MONOCYTES # BLD AUTO: 1.17 10*3/MM3 (ref 0.1–0.9)
MONOCYTES NFR BLD AUTO: 13.1 % (ref 5–12)
NEUTROPHILS NFR BLD AUTO: 5.77 10*3/MM3 (ref 1.7–7)
NEUTROPHILS NFR BLD AUTO: 64.6 % (ref 42.7–76)
NRBC BLD AUTO-RTO: 0 /100 WBC (ref 0–0.2)
PLATELET # BLD AUTO: 274 10*3/MM3 (ref 140–450)
PMV BLD AUTO: 9.5 FL (ref 6–12)
POTASSIUM SERPL-SCNC: 4.1 MMOL/L (ref 3.5–5.2)
PROT SERPL-MCNC: 6.9 G/DL (ref 6–8.5)
RBC # BLD AUTO: 6.03 10*6/MM3 (ref 4.14–5.8)
SODIUM SERPL-SCNC: 139 MMOL/L (ref 136–145)
WBC NRBC COR # BLD AUTO: 8.93 10*3/MM3 (ref 3.4–10.8)

## 2025-02-19 PROCEDURE — 80053 COMPREHEN METABOLIC PANEL: CPT

## 2025-02-19 PROCEDURE — 99195 PHLEBOTOMY: CPT

## 2025-02-19 PROCEDURE — 85025 COMPLETE CBC W/AUTO DIFF WBC: CPT

## 2025-02-19 PROCEDURE — 36415 COLL VENOUS BLD VENIPUNCTURE: CPT

## 2025-02-19 RX ORDER — SODIUM CHLORIDE 9 MG/ML
250 INJECTION, SOLUTION INTRAVENOUS ONCE
OUTPATIENT
Start: 2025-02-26

## 2025-02-19 RX ORDER — MONTELUKAST SODIUM 10 MG/1
10 TABLET ORAL NIGHTLY
COMMUNITY
Start: 2024-12-13

## 2025-02-19 RX ORDER — SODIUM CHLORIDE 9 MG/ML
250 INJECTION, SOLUTION INTRAVENOUS ONCE
Status: DISCONTINUED | OUTPATIENT
Start: 2025-02-19 | End: 2025-02-19 | Stop reason: HOSPADM

## 2025-02-19 NOTE — PROGRESS NOTES
Subjective     REASON FOR FOLLOWUP:   1  Erythrocytosis left shifted oxygen dissociation curve?  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  Bilateral kidney cysts  3.  Persistent elevated carboxyhemoglobin with no obvious cause                             REQUESTING PHYSICIAN: Alessandra ANDUJAR    History of Present Illness Mr. Medel is a mary 79 y.o.  male with the above-mentioned history who is here today for lab review and possible phlebotomy.  Overall he tolerates the phlebotomies quite well.  He does not require IV fluids    He reports that he is asymptomatic prior to having the phlebotomies, although today he feels flushed and a little congested in his head and his hematocrit is 50%.  He does feel improved wellbeing about a day or 2 after the phlebotomy.  He has no new problems or concerns today otherwise.     He has had no cerebrovascular cardiovascular problems since September 2018 when he had an MI at that time his hematocrit was actually 45% but I do think this is beneficial for him to prevent strokes and we will continue his phlebotomies as long as he can tolerate them and do this every 2 months    Past Medical History:   Diagnosis Date    CAD (coronary artery disease)     H/O Blood dyscrasia     History of snoring     Hyperlipidemia     Hypertension     Kidney stone     Myocardial infarction 09/22/2018    placed one stent    Seasonal allergies     Skin cancer     Left forearm    skin cancers for which he takes light treatments periodically    Past Surgical History:   Procedure Laterality Date    CARDIAC CATHETERIZATION  2018    CATARACT EXTRACTION      COLONOSCOPY      x4    CORONARY STENT PLACEMENT  2018    ENDOSCOPY  1972    HERNIA REPAIR  1995    KIDNEY STONE SURGERY  2006    SKIN CANCER EXCISION      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  Patient was referred to us for evaluation of this.  He has had some recent headaches and lightheadedness which she attributes to his blood pressure medicines but otherwise feels well.  His note night sweats weight loss or loss of appetite or pain anywhere.  He has been on the same blood pressure medicines for many years.  On questioning him further he reports that when he was in his early 20s he works as a  and his hemoglobin was high in the 16 g range even then and he thinks this may be a long term problem that he has had.  His 2 children and 3 sisters but does not know of anybody else has an elevated hemoglobin.  He is not a smoker does not have sleep apnea and has a carbon monoxide monitoring in his home.  He has had no recent CAT scans of the abdomen no hematuria or kidney disease.  He is not taking any testosterone injections or preparations.    71-year-old male with long-standing erythrocytosis returns today to review the results of his blood work and imaging.  We did a chest x-ray because he complained of a chronic cough and this is thankfully benign except for some mild hyperinflation/COPD which is unusual as he is a nonsmoker.  Suspect the lisinopril is causing his cough.  Ultrasound of kidneys showed bilateral large kidney cysts but no masses.  His erythropoietin level was low at 5 and his, carbon monoxide level was mildly elevated 2.4 and his P 50 was left shifted suggesting a possible-high affinity hemoglobin!  His tolerating phlebotomy well and he actually felt better after the phlebotomy and therefore we will continue for the time being although I did tell him that I would repeat his carbon monoxide level to make sure it is back down as he got a new furnace for his home and this may cause some changes.  And he could [facility] because he has no evidence of polycythemia vera/ bone marrow disorder at this point.        Current Outpatient Medications:     aspirin 81 MG disintegrating tablet, Take 81 mg by mouth., Disp: , Rfl:     atorvastatin (LIPITOR) 40 MG tablet, 1 tablet., Disp: , Rfl:     hydrocortisone 2.5 % cream, , Disp: , Rfl:     lisinopril (PRINIVIL,ZESTRIL) 10 MG tablet, Take 1 tablet by mouth Daily., Disp: , Rfl:     montelukast (SINGULAIR) 10 MG tablet, Take 1 tablet by mouth Every Night., Disp: , Rfl:     nitroglycerin (NITROSTAT) 0.4 MG SL tablet, Place 1 tablet under the tongue Every 5 (Five) Minutes As Needed for Chest Pain. Take no more than 3 doses in 15 minutes., Disp: , Rfl:     omega-3 acid ethyl esters (LOVAZA) 1 G capsule, , Disp: , Rfl:     polyethylene glycol (MIRALAX) packet, Take 17 g by mouth Daily., Disp: , Rfl:     Psyllium (METAMUCIL MULTIHEALTH FIBER PO), Metamucil [employer] Fiber, Disp: , Rfl:     Triamcinolone Acetonide (NASACORT) 55 MCG/ACT nasal inhaler, Administer 2 sprays into the nostril(s) as directed by provider Daily., Disp: , Rfl:     benzonatate (TESSALON) 200 MG capsule, Take 1 capsule by mouth 3 (Three) Times a Day As Needed for Cough. (Patient not taking: Reported on 2/19/2025), Disp: 30 capsule, Rfl: 0    Omega-3 Fatty Acids (fish oil) 1000 MG capsule capsule, Every 12 (Twelve) Hours. (Patient not taking: Reported on 2/19/2025), Disp: , Rfl:     ALLERGIES:    Allergies   Allergen Reactions    Erythromycin Unknown - Low Severity    Sulfa Antibiotics Unknown - Low Severity    Amoxicillin-Pot Clavulanate GI Intolerance     Stomach cramping--amoxicillin is OK          Social History     Socioeconomic History    Marital status:      Spouse name: Radha    Years of education: High school   Tobacco Use    Smoking status: Never    Smokeless tobacco: Never   Substance and Sexual Activity    Alcohol use: Yes     Comment: Occasionally    Drug use: No    Sexual activity: Defer     does  "not smoke or drink no risk factors for HIV worked in [employer] has no chemicals or toxic exposure     Family History   Problem Relation Age of Onset    Heart disease Mother     Hypertension Mother     Cancer Son 43        Squamous cell CA in tonsils      30-year-old son had squamous cell carcinoma of the tonsil 7 years ago and is JESSIE with no other malignancy in the family -nobody has a high hemoglobin in the family     Review of Systems   As per HPI    I have reviewed and confirmed the accuracy of the ROS as documented  Yoav Andino MD      Objective     Vitals:    02/19/25 1151   BP: 139/79   Pulse: 78   Resp: 16   Temp: 97.8 °F (36.6 °C)   TempSrc: Oral   SpO2: 93%   Weight: 116 kg (256 lb)   Height: 177.8 cm (70\")   PainSc: 0-No pain           2/19/2025    11:49 AM   Current Status   ECOG score 0       CONSTITUTIONAL:  Vital signs reviewed.  No distress, looks comfortable.  Plethoric  EYES:  Conjunctivae and lids unremarkable.  PERRLA  EARS,NOSE,MOUTH,THROAT:  Ears and nose appear unremarkable.  Lips, teeth, gums appear unremarkable.  RESPIRATORY:  Normal respiratory effort.  Lungs clear to auscultation bilaterally.  CARDIOVASCULAR:  Normal S1, S2.  No murmurs rubs or gallops.  No significant lower extremity edema.  GASTROINTESTINAL: Abdomen appears unremarkable.  Nontender.  No hepatomegaly.  No splenomegaly.  LYMPHATIC:  No cervical, supraclavicular, axillary lymphadenopathy.  SKIN:  Warm.  No rashes.  PSYCHIATRIC:  Normal judgment and insight.  Normal mood and affect.  I have reexamined the patient and the results are consistent with the previously documented exam. Yoav Andino MD           RECENT LABS:  Results from last 7 days   Lab Units 02/19/25  1055   WBC 10*3/mm3 8.93   NEUTROS ABS 10*3/mm3 5.77   HEMOGLOBIN g/dL 16.2   HEMATOCRIT % 50.6   PLATELETS 10*3/mm3 274     Results from last 7 days   Lab Units 02/19/25  1055   SODIUM mmol/L 139   POTASSIUM mmol/L 4.1   CHLORIDE " mmol/L 104   CO2 mmol/L 23.3   BUN mg/dL 16   CREATININE mg/dL 0.98   CALCIUM mg/dL 9.5   ALBUMIN g/dL 4.1   BILIRUBIN mg/dL 0.7   ALK PHOS U/L 80   ALT (SGPT) U/L 23   AST (SGOT) U/L 25   GLUCOSE mg/dL 94         US Renal Bilateral (10/06/2021 09:25)      Assessment & Plan     1.  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  JAK2 negative    Previously receiving phlebotomies every 2 months and was tolerating well. Had a heart attack in 9/2018 with a hematocrit of 45%.     For a time we did move him to every 3-month phlebotomies however his hematocrit began running around 50%.  We moved him back to every 2 months in 2020 and this has been keeping his hematocrit closer to 48%.    Hematocrit 2/2/2022 of 50.5%.  We will proceed with phlebotomy with goal to maintain hematocrit less than 48%.  He is essentially asymptomatic of his erythrocytosis    8/3/2022 hematocrit 49.0%, we will proceed with phlebotomy today.  Patient is essentially requiring phlebotomy every 2 months, which we will continue.    2.  Bilateral renal cysts.  Original ultrasound in 2016 with multiple bilateral renal cyst.  Repeat ultrasound 10/6/2021 which was completely unchanged.    3. MI   9/182018    PLAN:  Proceed with phlebotomy today for hematocrit of 50%.  Goal is to maintain hematocrit less than 48%.  Patient to return Q2mo  for CBC with possible phlebotomy.  Return for follow-up with   Dr. Andino in 12  months with repeat CBC and possible phlebotomy.  Call/return sooner should develop new concerns or problems.    Consider looking for calretinin and MPL mutations so Hydrea may be an option as he gets older      Yoav Andino MD  02/19/2025

## 2025-02-19 NOTE — TELEPHONE ENCOUNTER
PA pending for [facility] [id number] through [employer]. Pending auth # [id number]. Asked lab to hold. If approved, will go to [facility].

## 2025-02-28 ENCOUNTER — LAB (OUTPATIENT)
Dept: LAB | Facility: HOSPITAL | Age: 80
End: 2025-02-28
Payer: MEDICARE

## 2025-02-28 DIAGNOSIS — D75.1 ERYTHROCYTOSIS: Primary | ICD-10-CM

## 2025-02-28 LAB
BASOPHILS # BLD AUTO: 0.13 10*3/MM3 (ref 0–0.2)
BASOPHILS NFR BLD AUTO: 1.4 % (ref 0–1.5)
DEPRECATED RDW RBC AUTO: 47.2 FL (ref 37–54)
EOSINOPHIL # BLD AUTO: 0.19 10*3/MM3 (ref 0–0.4)
EOSINOPHIL NFR BLD AUTO: 2 % (ref 0.3–6.2)
ERYTHROCYTE [DISTWIDTH] IN BLOOD BY AUTOMATED COUNT: 15.9 % (ref 12.3–15.4)
HCT VFR BLD AUTO: 49.7 % (ref 37.5–51)
HGB BLD-MCNC: 15.5 G/DL (ref 13–17.7)
IMM GRANULOCYTES # BLD AUTO: 0.08 10*3/MM3 (ref 0–0.05)
IMM GRANULOCYTES NFR BLD AUTO: 0.8 % (ref 0–0.5)
LYMPHOCYTES # BLD AUTO: 1.7 10*3/MM3 (ref 0.7–3.1)
LYMPHOCYTES NFR BLD AUTO: 18 % (ref 19.6–45.3)
MCH RBC QN AUTO: 26.2 PG (ref 26.6–33)
MCHC RBC AUTO-ENTMCNC: 31.2 G/DL (ref 31.5–35.7)
MCV RBC AUTO: 84.1 FL (ref 79–97)
MONOCYTES # BLD AUTO: 1.38 10*3/MM3 (ref 0.1–0.9)
MONOCYTES NFR BLD AUTO: 14.6 % (ref 5–12)
NEUTROPHILS NFR BLD AUTO: 5.97 10*3/MM3 (ref 1.7–7)
NEUTROPHILS NFR BLD AUTO: 63.2 % (ref 42.7–76)
NRBC BLD AUTO-RTO: 0 /100 WBC (ref 0–0.2)
PLATELET # BLD AUTO: 325 10*3/MM3 (ref 140–450)
PMV BLD AUTO: 9.3 FL (ref 6–12)
RBC # BLD AUTO: 5.91 10*6/MM3 (ref 4.14–5.8)
WBC NRBC COR # BLD AUTO: 9.45 10*3/MM3 (ref 3.4–10.8)

## 2025-02-28 PROCEDURE — 36415 COLL VENOUS BLD VENIPUNCTURE: CPT

## 2025-02-28 PROCEDURE — 85025 COMPLETE CBC W/AUTO DIFF WBC: CPT

## 2025-03-10 LAB — MPL MUTATION RESULT: NORMAL

## 2025-03-19 ENCOUNTER — TELEPHONE (OUTPATIENT)
Dept: ONCOLOGY | Facility: CLINIC | Age: 80
End: 2025-03-19
Payer: MEDICARE

## 2025-03-19 NOTE — TELEPHONE ENCOUNTER
Called pt back and let him know that per Dr Andino this MPL test was negative. Pt requested a copy of this  and I have mailed it to him.

## 2025-03-19 NOTE — TELEPHONE ENCOUNTER
"    Caller: Miller Medel Jr. \"Jesus\"    Relationship: Self    Best call back number: [phone]    What test was performed: LABS - MOLECULAR TEST    When was the test performed: 2/28    Where was the test performed: [facility]    "

## 2025-04-16 ENCOUNTER — LAB (OUTPATIENT)
Dept: LAB | Facility: HOSPITAL | Age: 80
End: 2025-04-16
Payer: MEDICARE

## 2025-04-16 ENCOUNTER — INFUSION (OUTPATIENT)
Dept: ONCOLOGY | Facility: HOSPITAL | Age: 80
End: 2025-04-16
Payer: MEDICARE

## 2025-04-16 DIAGNOSIS — D75.1 ERYTHROCYTOSIS: ICD-10-CM

## 2025-04-16 LAB
DEPRECATED RDW RBC AUTO: 48.8 FL (ref 37–54)
ERYTHROCYTE [DISTWIDTH] IN BLOOD BY AUTOMATED COUNT: 16.2 % (ref 12.3–15.4)
HCT VFR BLD AUTO: 47.5 % (ref 37.5–51)
HGB BLD-MCNC: 15.3 G/DL (ref 13–17.7)
MCH RBC QN AUTO: 26.8 PG (ref 26.6–33)
MCHC RBC AUTO-ENTMCNC: 32.2 G/DL (ref 31.5–35.7)
MCV RBC AUTO: 83.3 FL (ref 79–97)
PLATELET # BLD AUTO: 175 10*3/MM3 (ref 140–450)
PMV BLD AUTO: 10.3 FL (ref 6–12)
RBC # BLD AUTO: 5.7 10*6/MM3 (ref 4.14–5.8)
WBC NRBC COR # BLD AUTO: 9.35 10*3/MM3 (ref 3.4–10.8)

## 2025-04-16 PROCEDURE — G0463 HOSPITAL OUTPT CLINIC VISIT: HCPCS

## 2025-04-16 PROCEDURE — 36415 COLL VENOUS BLD VENIPUNCTURE: CPT

## 2025-04-16 PROCEDURE — 85027 COMPLETE CBC AUTOMATED: CPT

## 2025-04-16 NOTE — PROGRESS NOTES
Pt does not meet phlebotomy criteria at this time. Copy of labs given to pt. Pt concerned that his hct will rise before his next appt. Spoke with Nakia CASTANEDA about situation and per Nakia pt was informed to reschedule lab appt for 3 weeks from today instead of 2 months. Pt agreed with plan and was instructed to contact clinic if he becomes symptomatic. Pt walked to scheduling for appt to be made in 3 weeks.     Lab Results   Component Value Date    WBC 9.35 04/16/2025    HGB 15.3 04/16/2025    HCT 47.5 04/16/2025    MCV 83.3 04/16/2025     04/16/2025

## 2025-05-06 ENCOUNTER — INFUSION (OUTPATIENT)
Dept: ONCOLOGY | Facility: HOSPITAL | Age: 80
End: 2025-05-06
Payer: MEDICARE

## 2025-05-06 ENCOUNTER — LAB (OUTPATIENT)
Dept: LAB | Facility: HOSPITAL | Age: 80
End: 2025-05-06
Payer: MEDICARE

## 2025-05-06 VITALS
BODY MASS INDEX: 36.22 KG/M2 | OXYGEN SATURATION: 95 % | TEMPERATURE: 98.4 F | SYSTOLIC BLOOD PRESSURE: 148 MMHG | WEIGHT: 252.4 LBS | DIASTOLIC BLOOD PRESSURE: 71 MMHG | RESPIRATION RATE: 16 BRPM | HEART RATE: 80 BPM

## 2025-05-06 DIAGNOSIS — D75.1 ERYTHROCYTOSIS: ICD-10-CM

## 2025-05-06 DIAGNOSIS — D75.1 ERYTHROCYTOSIS: Primary | ICD-10-CM

## 2025-05-06 LAB
DEPRECATED RDW RBC AUTO: 48.8 FL (ref 37–54)
ERYTHROCYTE [DISTWIDTH] IN BLOOD BY AUTOMATED COUNT: 16.3 % (ref 12.3–15.4)
HCT VFR BLD AUTO: 48.2 % (ref 37.5–51)
HGB BLD-MCNC: 15.4 G/DL (ref 13–17.7)
MCH RBC QN AUTO: 26.6 PG (ref 26.6–33)
MCHC RBC AUTO-ENTMCNC: 32 G/DL (ref 31.5–35.7)
MCV RBC AUTO: 83.4 FL (ref 79–97)
PLATELET # BLD AUTO: 238 10*3/MM3 (ref 140–450)
PMV BLD AUTO: 10.2 FL (ref 6–12)
RBC # BLD AUTO: 5.78 10*6/MM3 (ref 4.14–5.8)
WBC NRBC COR # BLD AUTO: 9.2 10*3/MM3 (ref 3.4–10.8)

## 2025-05-06 PROCEDURE — 99195 PHLEBOTOMY: CPT

## 2025-05-06 PROCEDURE — 36415 COLL VENOUS BLD VENIPUNCTURE: CPT

## 2025-05-06 PROCEDURE — 85027 COMPLETE CBC AUTOMATED: CPT

## 2025-07-01 ENCOUNTER — INFUSION (OUTPATIENT)
Dept: ONCOLOGY | Facility: HOSPITAL | Age: 80
End: 2025-07-01
Payer: MEDICARE

## 2025-07-01 ENCOUNTER — LAB (OUTPATIENT)
Dept: LAB | Facility: HOSPITAL | Age: 80
End: 2025-07-01
Payer: MEDICARE

## 2025-07-01 VITALS
BODY MASS INDEX: 35.87 KG/M2 | DIASTOLIC BLOOD PRESSURE: 77 MMHG | SYSTOLIC BLOOD PRESSURE: 151 MMHG | OXYGEN SATURATION: 95 % | TEMPERATURE: 97.3 F | HEART RATE: 72 BPM | RESPIRATION RATE: 16 BRPM | WEIGHT: 250 LBS

## 2025-07-01 DIAGNOSIS — D75.1 ERYTHROCYTOSIS: Primary | ICD-10-CM

## 2025-07-01 DIAGNOSIS — D75.1 ERYTHROCYTOSIS: ICD-10-CM

## 2025-07-01 LAB
DEPRECATED RDW RBC AUTO: 45.7 FL (ref 37–54)
ERYTHROCYTE [DISTWIDTH] IN BLOOD BY AUTOMATED COUNT: 15.3 % (ref 12.3–15.4)
HCT VFR BLD AUTO: 49 % (ref 37.5–51)
HGB BLD-MCNC: 15.8 G/DL (ref 13–17.7)
MCH RBC QN AUTO: 26.6 PG (ref 26.6–33)
MCHC RBC AUTO-ENTMCNC: 32.2 G/DL (ref 31.5–35.7)
MCV RBC AUTO: 82.4 FL (ref 79–97)
PLATELET # BLD AUTO: 226 10*3/MM3 (ref 140–450)
PMV BLD AUTO: 10.5 FL (ref 6–12)
RBC # BLD AUTO: 5.95 10*6/MM3 (ref 4.14–5.8)
WBC NRBC COR # BLD AUTO: 8.49 10*3/MM3 (ref 3.4–10.8)

## 2025-07-01 PROCEDURE — 36415 COLL VENOUS BLD VENIPUNCTURE: CPT

## 2025-07-01 PROCEDURE — 85027 COMPLETE CBC AUTOMATED: CPT

## 2025-07-01 PROCEDURE — 99195 PHLEBOTOMY: CPT

## 2025-07-01 RX ORDER — SODIUM CHLORIDE 9 MG/ML
250 INJECTION, SOLUTION INTRAVENOUS ONCE
Status: DISCONTINUED | OUTPATIENT
Start: 2025-07-01 | End: 2025-07-01 | Stop reason: HOSPADM

## 2025-07-01 RX ORDER — SODIUM CHLORIDE 9 MG/ML
250 INJECTION, SOLUTION INTRAVENOUS ONCE
OUTPATIENT
Start: 2025-07-08

## 2025-08-26 ENCOUNTER — LAB (OUTPATIENT)
Dept: LAB | Facility: HOSPITAL | Age: 80
End: 2025-08-26
Payer: MEDICARE

## 2025-08-26 ENCOUNTER — INFUSION (OUTPATIENT)
Dept: ONCOLOGY | Facility: HOSPITAL | Age: 80
End: 2025-08-26
Payer: MEDICARE

## 2025-08-26 DIAGNOSIS — D75.1 ERYTHROCYTOSIS: ICD-10-CM

## 2025-08-26 LAB
DEPRECATED RDW RBC AUTO: 46.7 FL (ref 37–54)
ERYTHROCYTE [DISTWIDTH] IN BLOOD BY AUTOMATED COUNT: 16 % (ref 12.3–15.4)
HCT VFR BLD AUTO: 46.4 % (ref 37.5–51)
HGB BLD-MCNC: 15.6 G/DL (ref 13–17.7)
MCH RBC QN AUTO: 27.4 PG (ref 26.6–33)
MCHC RBC AUTO-ENTMCNC: 33.6 G/DL (ref 31.5–35.7)
MCV RBC AUTO: 81.4 FL (ref 79–97)
PLATELET # BLD AUTO: 322 10*3/MM3 (ref 140–450)
PMV BLD AUTO: 9.6 FL (ref 6–12)
RBC # BLD AUTO: 5.7 10*6/MM3 (ref 4.14–5.8)
WBC NRBC COR # BLD AUTO: 11.23 10*3/MM3 (ref 3.4–10.8)

## 2025-08-26 PROCEDURE — G0463 HOSPITAL OUTPT CLINIC VISIT: HCPCS

## 2025-08-26 PROCEDURE — 36415 COLL VENOUS BLD VENIPUNCTURE: CPT

## 2025-08-26 PROCEDURE — 85027 COMPLETE CBC AUTOMATED: CPT
